# Patient Record
Sex: FEMALE | Race: WHITE | NOT HISPANIC OR LATINO | Employment: OTHER | URBAN - NONMETROPOLITAN AREA
[De-identification: names, ages, dates, MRNs, and addresses within clinical notes are randomized per-mention and may not be internally consistent; named-entity substitution may affect disease eponyms.]

---

## 2017-04-10 ENCOUNTER — TRANSCRIBE ORDERS (OUTPATIENT)
Dept: PHYSICAL THERAPY | Facility: HOSPITAL | Age: 32
End: 2017-04-10

## 2017-04-10 ENCOUNTER — HOSPITAL ENCOUNTER (OUTPATIENT)
Dept: PHYSICAL THERAPY | Facility: HOSPITAL | Age: 32
Setting detail: THERAPIES SERIES
Discharge: HOME | End: 2017-04-10

## 2017-04-10 DIAGNOSIS — G89.29 CHRONIC LEFT-SIDED LOW BACK PAIN WITHOUT SCIATICA: ICD-10-CM

## 2017-04-10 DIAGNOSIS — M47.26 OSTEOARTHRITIS OF SPINE WITH RADICULOPATHY, LUMBAR REGION: Primary | ICD-10-CM

## 2017-04-10 DIAGNOSIS — M54.42 ACUTE BACK PAIN WITH SCIATICA, LEFT: Primary | ICD-10-CM

## 2017-04-10 DIAGNOSIS — M54.50 CHRONIC LEFT-SIDED LOW BACK PAIN WITHOUT SCIATICA: ICD-10-CM

## 2017-04-10 PROCEDURE — G8979 MOBILITY GOAL STATUS: HCPCS

## 2017-04-10 PROCEDURE — 97162 PT EVAL MOD COMPLEX 30 MIN: CPT

## 2017-04-10 PROCEDURE — G8978 MOBILITY CURRENT STATUS: HCPCS

## 2017-04-10 PROCEDURE — 97161 PT EVAL LOW COMPLEX 20 MIN: CPT

## 2017-04-10 NOTE — PROGRESS NOTES
"    Outpatient Physical Therapy Ortho Initial Evaluation  [facility]     Patient Name: Arlen Brice  : 1985  MRN: [id number]  Today's Date: 4/10/2017      Visit Date: 04/10/2017    There is no problem list on file for this patient.       Past Medical History:   Diagnosis Date   • Asthma    • Disease of thyroid gland    • Hepatitis C         Past Surgical History:   Procedure Laterality Date   • APPENDECTOMY     •  SECTION      Total of 5   • CHOLECYSTECTOMY         Visit Dx:     ICD-10-CM ICD-9-CM   1. Osteoarthritis of spine with radiculopathy, lumbar region M47.26 721.3   2. Chronic left-sided low back pain without sciatica M54.5 724.2    G89.29 338.29             Patient History       04/10/17 1351          History    Chief Complaint Pain;Difficulty Walking;Difficulty with daily activities  -TC      Type of Pain Back pain;Lower Extremity / Leg   LLE   -TC      Date Current Problem(s) Began 17  -TC      Brief Description of Current Complaint Pt reports she has had 5 C sections and after her last birth she began to have nerve pain s/p epidural \"hitting a nerve.\" This is when her nerve pain/left leg pain began along with her chronic back pain. Her left leg will go all the way numb at times. The entire leg will go numb even the inside of her leg all the way down to her foot/toes. At this time she has no numbness or tingling. The sharp pain really stays in her back and shoots into her buttock with certain motions such as bending, picking something up and twising. N&T LLE increase with increase walking, standing, and reaching behind towards the left. Denies any falls and/or changes in bowel/bladder.    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 Yes  -TC      What clinical tests have you had for this problem? X-ray  " -TC      Results of Clinical Tests when she was 23 years old it showed degenerative changes   -TC      Pain     Pain Location Back  -TC      Pain at Present 5  -TC      Pain at Best 1;2  -TC      Pain at Worst 8  -TC      Pain Frequency Constant/continuous;Intermittent   constant back pain, intermittent LLE radicular pain   -TC      Pain Description Aching;Sharp;Shooting;Tingling;Numbness  -TC      What Performance Factors Make the Current Problem(s) WORSE? walking, standing >10 mins, bending over to  objects, reaching behind, trying to straighten up  -TC      What Performance Factors Make the Current Problem(s) BETTER? lying down, resting  -TC      Tolerance Time- Standing <10mins  -TC      Tolerance Time- Walking <10 mins  -TC      Is your sleep disturbed? Yes  -TC      What position do you sleep in? Right sidelying;Left sidelying  -TC      Fall Risk Assessment    Any falls in the past year: No  -TC      Services    Prior Rehab/Home Health Experiences Yes  -TC      Daily Activities    Primary Language English  -TC      Are you able to read Yes  -TC      Are you able to write Yes  -TC      How does patient learn best? Listening;Reading;Demonstration  -TC      Teaching needs identified Home Exercise Program;Management of Condition  -TC      Patient is concerned about/has problems with Walking;Standing;Performing home management (household chores, shopping, care of dependents)  -TC        User Key  (r) = Recorded By, (t) = Taken By, (c) = Cosigned By    Initials Name Provider Type    TC Ese Valdovinos, PT Physical Therapist                PT Ortho       04/10/17 1351    Posture/Observations    Alignment Options Thoracic kyphosis;Rounded shoulders;Cervical lordosis  -TC    Cervical Lordosis Moderate  -TC    Thoracic Kyphosis Moderate  -TC    Rounded Shoulders Moderate  -TC    Lumbar lordosis Moderate  -TC    Iliac crests --   level  -TC    Genu valgus Mild  -TC    Posture/Observations Comments also presents  with anterior pelvic tilt   -TC    Sensation    Light Touch No apparent deficits  -TC    Quarter Clearing    Quarter Clearing Lower Quarter Clearing  -TC    DTR- Lower Quarter Clearing    Patellar tendon (L2-4) Left:;1- Minimal response;Right:;2- Normal response  -TC    Achilles tendon (S1-2) Left:;Unable to assess;Right:;2- Normal response   pt unable to relax LLE limiting ability to test reflex  -TC    Neural Tension Signs- Lower Quarter Clearing    SLR Bilateral:;Negative  -TC    Sensory Screen for Light Touch- Lower Quarter Clearing    L1 (inguinal area) Bilateral:;Intact  -TC    L2 (anterior mid thigh) Bilateral:;Intact  -TC    L3 (distal anterior thigh) Bilateral:;Intact  -TC    L4 (medial lower leg/foot) Bilateral:;Intact  -TC    L5 (lateral lower leg/great toe) Bilateral:;Intact  -TC    S1 (bottom of foot) Bilateral:;Intact  -TC    Myotomal Screen- Lower Quarter Clearing    Hip flexion (L2) Left:;4- (Good -);Right:;4+ (Good +)  -TC    Knee extension (L3) Left:;3+ (Fair +);Right:;5 (Normal)  -TC    Ankle DF (L4) Bilateral:;4+ (Good +)  -TC    Great toe extension (L5) Bilateral:;4+ (Good +)  -TC    Ankle PF (S1) Bilateral:;5 (Normal)  -TC    Knee flexion (S2) Bilateral:;5 (Normal)  -TC    Lumbar ROM Screen- Lower Quarter Clearing    Lumbar Flexion Impaired   75% impaired; elicited pain on L   -TC    Lumbar Extension Impaired   pt unable to attain neutral; lacking 100%; pain on L  -TC    Lumbar Lateral Flexion Impaired   jenny 50% impaired; pain elicited with L SBing   -TC    Lumbar Rotation Impaired   Rt 50% and L 75% due to back pain and LLE pain to knee   -TC    SI/Hip Screen- Lower Quarter Clearing    ASIS compression Bilateral:;Negative  -TC    ASIS distraction Bilateral:;Negative  -TC    Posterior thigh sheer Left:;Positive  -TC    Special Tests/Palpation    Special Tests/Palpation Lumbar/SI;Cervical/Thoracic  -TC    Thoracic Accessory Motions    Thoracic Accessory Motions Tested? Yes  -TC    Pa glide-  Upper thoracic Hypomobile  -TC    Pa glide- Middle thoracic Hypomobile  -TC    Pa glide- Lower thoracic Hypomobile  -TC    PA glide- T7 --   cavitation noted   -TC    Lumbosacral Palpation    SI Left:;Tender  -TC    Lumbosacral Segment Tender  -TC    Spinous Process --   L 3-4 most tender  -TC    Piriformis Left:;Tender;Guarded/taut  -TC    Sacrotubrous Ligament Left:;Tender  -TC    Lumbosacral Palpation? Yes  -TC    Lumbosacral Accessory Motions    Lumbosacral Accessory Motions Tested? Yes  -TC    PA Glide- L3 Hypermobile  -TC    PA Glide- L4 Hypermobile  -TC    Lumbar/SI Special Tests    Stork Test (SI Dysfunction) Left:;Positive  -TC    Trendelenburg Test (Gluteus Medius Weakness) Bilateral:;Positive   L>R  -TC    SLR (Neural Tension) Bilateral:;Negative  -TC    Sacral Spring Test (SI Dysfunction) Positive   pain was centered and located directly over sacrum   -TC    ROM (Range of Motion)    General ROM lower extremity range of motion deficits identified;head/neck/trunk range of motion deficits identified  -TC    General Head/Neck/Trunk Assessment    ROM trunk ROM deficit  -TC    ROM Detail see detail listed above   -TC    General LE Assessment    ROM hip, left: LE ROM deficit;hip, right: LE ROM deficit  -TC    Left Hip    Flexion PROM Deficit 50% impaired   limited d/t increased hip pain and adipose tissue   -TC    Extension PROM Deficit 25%   -TC    Internal Rotation PROM Deficit 100%   -TC    External Rotation PROM Deficit 75%   -TC    Right Hip    Extension PROM Deficit 25%   -TC    Internal Rotation AROM Deficit 25%   -TC    MMT (Manual Muscle Testing)    General MMT Assessment upper extremity strength deficits identified;lower extremity strength deficits identified  -TC    Left Hip    Hip Abduction Gluteus Medius (3+/5) fair plus  -TC    Right Hip    Hip Abduction Gluteus Medius (3/5) fair   pt reported that this testing position elicited pain on Lt  -TC    Lower Extremity    Lower Ext Manual Muscle Testing  left hip strength deficit;right hip strength deficit;left knee strength deficit  -TC    Left Knee    Knee Extension Gross Movement (3+/5) fair plus  -TC    Pathomechanics    Spine Pathomechanics Excessive thoracic kyphosis with forward bend;Limited lumbar flattening with forward bend  -TC    Balance Skills Training    SLS BLE <5s ; LLE elicted pain described as N&T into post thigh  -TC    Gait Assessment/Treatment    Gait, Comment Pt ambulates with increased MATHEUS, increased knee flexion, an anterior pelvic tilt and limited trunk rotation with lateral trunk flexion.  -TC      User Key  (r) = Recorded By, (t) = Taken By, (c) = Cosigned By    Initials Name Provider Type    TC Ese Valdovinos, PT Physical Therapist                                PT OP Goals       04/10/17 1500       PT Short Term Goals    STG Date to Achieve 05/01/17  -TC     STG 1 Pt will be able to demonstrate a 50% improvement in her thoracolumbar ROM in order to restore overall posturing.    -TC     STG 1 Progress New  -     STG 2 Pt will illustrate improved posture avoiding forward flexed posture and anterior pelvic tilt to promote more functional movements during ADLs.    -TC     STG 2 Progress New  -     Long Term Goals    LTG Date to Achieve 05/22/17  -TC     LTG 1 Pt will be able to demonstrate full thoracolumbar ROM reporting <2-3/10 pain in order to restore overall posturing and decrease strain placed on lumbar spine.     -TC     LTG 1 Progress New  -     LTG 2 Pt will show full hip PROM painfree in order to improve mechanics of gait and decrease the amount of strain placed on her SIJ and lower lumbar spine.   -TC     LTG 2 Progress New  -TC     LTG 3 Pt will illustrate gluteus medius strength to 4/5 so that she will be able to demonstrate decreased compensatory strategies during gait and decrease abnormal forces placed through lumbar spine.   -TC     LTG 3 Progress New  -     LTG 4 Pt will demonstrate normalized mechanics of gait  avoiding lateral trunk flexion, bilateral knee extension, and increased thoracic rotation.   -TC     LTG 4 Progress New  -TC     LTG 5 Pt will be able to perform bilateral SLS for >15s in order to show improvements in hip and core control/stability.   -TC     LTG 5 Progress New  -TC     Time Calculation    PT Goal Re-Cert Due Date 05/10/17  -TC       User Key  (r) = Recorded By, (t) = Taken By, (c) = Cosigned By    Initials Name Provider Type    TC Ese Valdovinos, PT Physical Therapist                PT Assessment/Plan       04/10/17 1351       PT Assessment    Functional Limitations Impaired gait;Limitations in functional capacity and performance;Limitation in home management;Limitations in community activities  -TC     Impairments Balance;Endurance;Gait;Impaired flexibility;Impaired muscle length;Impaired postural alignment;Joint mobility;Impaired reflex integrity;Muscle strength;Pain;Posture;Range of motion  -TC     Assessment Comments Upon assessment pt presents with signs and symptoms that most closely correlate with L3-4 nerve impingement on the left along with an SIJ strain on the left. She demonstrates decreased patellar reflex, myotomal impairment with no signs of dermatomal loss. She illustrates a sway back posturing with an anterior pelvic tilt causing her to hinge at L3-4. We found her to present with thoracic hypomobility , left hip hypomobility, and L3-4 hypermobility. These impairments along with diminished core and gluteal activation lead to poor pathomechanics of the spine and place increased strain across her lumbar spine and SIJ during functional activity. We will work with her to progress her towards her functional goals. We may be limited towards progression due to chronicity of her symptoms and her comorbidities. Thank you for this referral, we look forward to working with her.   -TC     Please refer to paper survey for additional self-reported information Yes  -TC     Rehab Potential Good   -TC     Patient/caregiver participated in establishment of treatment plan and goals Yes  -TC     Patient would benefit from skilled therapy intervention Yes  -TC     PT Plan    PT Frequency 3x/week  -TC     Predicted Duration of Therapy Intervention (days/wks) 6 weeks   -TC     Planned CPT's? PT THER PROC EA 15 MIN: 20718;PT THER ACT EA 15 MIN: 22715;PT MANUAL THERAPY EA 15 MIN: 35933;PT NEUROMUSC RE-EDUCATION EA 15 MIN: 11899;PT GAIT TRAINING EA 15 MIN: 65279;PT ELECTRICAL STIM UNATTEND: ;PT ELECTRICAL STIM ATTD EA 15 MIN: 00001;PT ULTRASOUND EA 15 MIN: 87031;PT EVAL MOD COMPLELITY: 18230  -TC     Physical Therapy Interventions (Optional Details) balance training;gait training;home exercise program;joint mobilization;lumbar stabilization;manual therapy techniques;motor coordination training;neuromuscular re-education;patient/family education;postural re-education;ROM (Range of Motion);stair training;strengthening;stretching;taping;transfer training  -TC     PT Plan Comments Initially we will work towards improving her flexibility, spinal and hip mobility in order to restore her ROM, decrease radicular pain, and improve her postural alignment. We will address her poor core and gluteal activation in order to correct her mechanics during standing and gait related activities and progress her towards more functional strengthening to improve muscle imbalance to decrease the amount of strain placed on her lumbar spine and SIJ.  -TC       User Key  (r) = Recorded By, (t) = Taken By, (c) = Cosigned By    Initials Name Provider Type    TC Ese Valdovinos PT Physical Therapist                                    Outcome Measures       04/10/17 1351          Modified Oswestry    Modified Oswestry Score/Comments 28  -TC      Functional Assessment    Outcome Measure Options Modifed Owestry  -TC        User Key  (r) = Recorded By, (t) = Taken By, (c) = Cosigned By    Initials Name Provider Type    TC Ese Valdovinos,  PT Physical Therapist            Time Calculation:   Start Time: 1351  Stop Time: 1453  Time Calculation (min): 62 min     Therapy Charges for Today     Code Description Service Date Service Provider Modifiers Qty    81830342519 HC PT MOBILITY CURRENT 4/10/2017 Ese Valdovinos, PT GP, CJ 1    51136265557 HC PT MOBILITY PROJECTED 4/10/2017 Ese Valdovinos, PT GP, CI 1    [id number] HC PT EVAL MOD COMPLEXITY 4 4/10/2017 Ese Valdovinos, PT GP 1          PT G-Codes  Outcome Measure Options: Modifed Dennyy  Score: 28  Functional Limitation: Mobility: Walking and moving around  Mobility: Walking and Moving Around Current Status (): At least 20 percent but less than 40 percent impaired, limited or restricted  Mobility: Walking and Moving Around Goal Status (): At least 1 percent but less than 20 percent impaired, limited or restricted         Ese Valdovinos, PT  4/10/2017

## 2017-04-12 ENCOUNTER — HOSPITAL ENCOUNTER (OUTPATIENT)
Dept: PHYSICAL THERAPY | Facility: HOSPITAL | Age: 32
Setting detail: THERAPIES SERIES
Discharge: HOME | End: 2017-04-12

## 2017-04-12 DIAGNOSIS — M54.50 CHRONIC LEFT-SIDED LOW BACK PAIN WITHOUT SCIATICA: Primary | ICD-10-CM

## 2017-04-12 DIAGNOSIS — G89.29 CHRONIC LEFT-SIDED LOW BACK PAIN WITHOUT SCIATICA: Primary | ICD-10-CM

## 2017-04-12 PROCEDURE — 97110 THERAPEUTIC EXERCISES: CPT

## 2017-04-12 PROCEDURE — 97140 MANUAL THERAPY 1/> REGIONS: CPT

## 2017-04-12 NOTE — PROGRESS NOTES
Outpatient Physical Therapy Ortho Treatment Note  [facility]     Patient Name: Arlen Brice  : 1985  MRN: [id number]  Today's Date: 2017      Visit Date: 2017    Visit Dx:    ICD-10-CM ICD-9-CM   1. Chronic left-sided low back pain without sciatica M54.5 724.2    G89.29 338.29       There is no problem list on file for this patient.       Past Medical History:   Diagnosis Date   • Asthma    • Disease of thyroid gland    • Hepatitis C         Past Surgical History:   Procedure Laterality Date   • APPENDECTOMY     •  SECTION      Total of 5   • CHOLECYSTECTOMY               PT Ortho       04/10/17 1351    Posture/Observations    Alignment Options Thoracic kyphosis;Rounded shoulders;Cervical lordosis  -TC    Cervical Lordosis Moderate  -TC    Thoracic Kyphosis Moderate  -TC    Rounded Shoulders Moderate  -TC    Lumbar lordosis Moderate  -TC    Iliac crests --   level  -TC    Genu valgus Mild  -TC    Posture/Observations Comments also presents with anterior pelvic tilt   -TC    Sensation    Light Touch No apparent deficits  -TC    Quarter Clearing    Quarter Clearing Lower Quarter Clearing  -TC    DTR- Lower Quarter Clearing    Patellar tendon (L2-4) Left:;1- Minimal response;Right:;2- Normal response  -TC    Achilles tendon (S1-2) Left:;Unable to assess;Right:;2- Normal response   pt unable to relax LLE limiting ability to test reflex  -TC    Neural Tension Signs- Lower Quarter Clearing    SLR Bilateral:;Negative  -TC    Sensory Screen for Light Touch- Lower Quarter Clearing    L1 (inguinal area) Bilateral:;Intact  -TC    L2 (anterior mid thigh) Bilateral:;Intact  -TC    L3 (distal anterior thigh) Bilateral:;Intact  -TC    L4 (medial lower leg/foot) Bilateral:;Intact  -TC    L5 (lateral lower leg/great toe) Bilateral:;Intact  -TC    S1 (bottom of foot) Bilateral:;Intact  -TC    Myotomal Screen- Lower Quarter Clearing    Hip flexion (L2) Left:;4- (Good -);Right:;4+ (Good +)  -TC     Knee extension (L3) Left:;3+ (Fair +);Right:;5 (Normal)  -TC    Ankle DF (L4) Bilateral:;4+ (Good +)  -TC    Great toe extension (L5) Bilateral:;4+ (Good +)  -TC    Ankle PF (S1) Bilateral:;5 (Normal)  -TC    Knee flexion (S2) Bilateral:;5 (Normal)  -TC    Lumbar ROM Screen- Lower Quarter Clearing    Lumbar Flexion Impaired   75% impaired; elicited pain on L   -TC    Lumbar Extension Impaired   pt unable to attain neutral; lacking 100%; pain on L  -TC    Lumbar Lateral Flexion Impaired   jenny 50% impaired; pain elicited with L SBing   -TC    Lumbar Rotation Impaired   Rt 50% and L 75% due to back pain and LLE pain to knee   -TC    SI/Hip Screen- Lower Quarter Clearing    ASIS compression Bilateral:;Negative  -TC    ASIS distraction Bilateral:;Negative  -TC    Posterior thigh sheer Left:;Positive  -TC    Special Tests/Palpation    Special Tests/Palpation Lumbar/SI;Cervical/Thoracic  -TC    Thoracic Accessory Motions    Thoracic Accessory Motions Tested? Yes  -TC    Pa glide- Upper thoracic Hypomobile  -TC    Pa glide- Middle thoracic Hypomobile  -TC    Pa glide- Lower thoracic Hypomobile  -TC    PA glide- T7 --   cavitation noted   -TC    Lumbosacral Palpation    SI Left:;Tender  -TC    Lumbosacral Segment Tender  -TC    Spinous Process --   L 3-4 most tender  -TC    Piriformis Left:;Tender;Guarded/taut  -TC    Sacrotubrous Ligament Left:;Tender  -TC    Lumbosacral Palpation? Yes  -TC    Lumbosacral Accessory Motions    Lumbosacral Accessory Motions Tested? Yes  -TC    PA Glide- L3 Hypermobile  -TC    PA Glide- L4 Hypermobile  -TC    Lumbar/SI Special Tests    Stork Test (SI Dysfunction) Left:;Positive  -TC    Trendelenburg Test (Gluteus Medius Weakness) Bilateral:;Positive   L>R  -TC    SLR (Neural Tension) Bilateral:;Negative  -TC    Sacral Spring Test (SI Dysfunction) Positive   pain was centered and located directly over sacrum   -TC    ROM (Range of Motion)    General ROM lower extremity range of motion deficits  identified;head/neck/trunk range of motion deficits identified  -TC    General Head/Neck/Trunk Assessment    ROM trunk ROM deficit  -TC    ROM Detail see detail listed above   -TC    General LE Assessment    ROM hip, left: LE ROM deficit;hip, right: LE ROM deficit  -TC    Left Hip    Flexion PROM Deficit 50% impaired   limited d/t increased hip pain and adipose tissue   -TC    Extension PROM Deficit 25%   -TC    Internal Rotation PROM Deficit 100%   -TC    External Rotation PROM Deficit 75%   -TC    Right Hip    Extension PROM Deficit 25%   -TC    Internal Rotation AROM Deficit 25%   -TC    MMT (Manual Muscle Testing)    General MMT Assessment upper extremity strength deficits identified;lower extremity strength deficits identified  -TC    Left Hip    Hip Abduction Gluteus Medius (3+/5) fair plus  -TC    Right Hip    Hip Abduction Gluteus Medius (3/5) fair   pt reported that this testing position elicited pain on Lt  -TC    Lower Extremity    Lower Ext Manual Muscle Testing left hip strength deficit;right hip strength deficit;left knee strength deficit  -TC    Left Knee    Knee Extension Gross Movement (3+/5) fair plus  -TC    Pathomechanics    Spine Pathomechanics Excessive thoracic kyphosis with forward bend;Limited lumbar flattening with forward bend  -TC    Balance Skills Training    SLS BLE <5s ; LLE elicted pain described as N&T into post thigh  -TC    Gait Assessment/Treatment    Gait, Comment Pt ambulates with increased MATHEUS, increased knee flexion, an anterior pelvic tilt and limited trunk rotation with lateral trunk flexion.  -      User Key  (r) = Recorded By, (t) = Taken By, (c) = Cosigned By    Initials Name Provider Type     Ese Valdovinos, PT Physical Therapist                            PT Assessment/Plan       04/12/17 8506       PT Assessment    Assessment Comments Pt reports similar symptoms also stating that she has not been able to perform her HEP. She did not report any LLE symptoms  throughout our session today, nothing exacerbated. Her thoracic and lumbosacral spine continue to be hypomobile along with her hips limiting her spinal ROM and continuing to place increased strain at L3-4 and L SIJ.   -TC     PT Plan    PT Plan Comments We will continue to initially address her spinal and hip mobility and flexibility in order to restore ROM. Reassess HEP, avoid radicular pain and improve her core and gluteal stability.   -TC       User Key  (r) = Recorded By, (t) = Taken By, (c) = Cosigned By    Initials Name Provider Type    TC Ese Valdovinos, PT Physical Therapist                    Exercises       04/12/17 1331          Subjective Comments    Subjective Comments Pt reported that she feels the same. She has not gotten a chance to do exercises yesterday. She cleaned yesterday and she had increased pain   -TC      Subjective Pain    Able to rate subjective pain? yes  -TC      Pre-Treatment Pain Level 7  -TC      Subjective Pain Comment Pt reports low back and denies LLE pain   -TC      Exercise 1    Exercise Name 1 LTR in hooklying   -TC      Cueing 1 Demo  -TC      Sets 1 2  -TC      Reps 1 15  -TC      Exercise 2    Exercise Name 2 PPT in hooklying   -TC      Cueing 2 Demo  -TC      Sets 2 3  -TC      Reps 2 10  -TC      Exercise 3    Exercise Name 3 BKFO w/o resistance   -TC      Cueing 3 Demo  -TC      Sets 3 2  -TC      Reps 3 15  -TC      Exercise 4    Exercise Name 4 Educated on compliance with HEP and importance of exercise and posture  -TC      Time (Minutes) 4 2  -TC        User Key  (r) = Recorded By, (t) = Taken By, (c) = Cosigned By    Initials Name Provider Type    TC Ese Valdovinos, PT Physical Therapist                        Manual Rx (last 36 hours)      Manual Treatments       04/12/17 1331          Manual Rx 1    Manual Rx 1 Location prone thoracic  -TC      Manual Rx 1 Type central PAs to lower and upper and T5-7  -TC      Manual Rx 1 Grade 2-3  -TC      Manual Rx 1  Duration 10  -TC      Manual Rx 2    Manual Rx 2 Location L hip   -TC      Manual Rx 2 Type lateral and inferior hip glides at 60, 90, and 90 w/ some IR  -TC      Manual Rx 2 Grade 3  -TC      Manual Rx 2 Duration 10  -TC      Manual Rx 3    Manual Rx 3 Location Lt leg   -TC      Manual Rx 3 Type LAD  -TC      Manual Rx 3 Duration 1--no change in symptoms   -TC      Manual Rx 4    Manual Rx 4 Location Lt hip   -TC      Manual Rx 4 Type PROM into IR and ER--painfree and improved IR afterwards   -TC      Manual Rx 4 Duration 5  -TC      Manual Rx 5    Manual Rx 5 Location prone LS distraction   -TC      Manual Rx 5 Grade 3  -TC      Manual Rx 5 Duration 2  -TC        User Key  (r) = Recorded By, (t) = Taken By, (c) = Cosigned By    Initials Name Provider Type    TC Ese Valdovinos, PT Physical Therapist                PT OP Goals       04/12/17 1331       PT Short Term Goals    STG Date to Achieve 05/01/17  -TC     STG 1 Pt will be able to demonstrate a 50% improvement in her thoracolumbar ROM in order to restore overall posturing.    -TC     STG 1 Progress Ongoing  -TC     STG 2 Pt will illustrate improved posture avoiding forward flexed posture and anterior pelvic tilt to promote more functional movements during ADLs.    -TC     STG 2 Progress Ongoing  -TC     Long Term Goals    LTG Date to Achieve 05/22/17  -TC     LTG 1 Pt will be able to demonstrate full thoracolumbar ROM reporting <2-3/10 pain in order to restore overall posturing and decrease strain placed on lumbar spine.     -TC     LTG 1 Progress Ongoing  -TC     LTG 1 Progress Comments still reports 7/10 pain with intermittent radicular pain but none during session  -TC     LTG 2 Pt will show full hip PROM painfree in order to improve mechanics of gait and decrease the amount of strain placed on her SIJ and lower lumbar spine.   -TC     LTG 2 Progress Ongoing  -TC     LTG 3 Pt will illustrate gluteus medius strength to 4/5 so that she will be able to  demonstrate decreased compensatory strategies during gait and decrease abnormal forces placed through lumbar spine.   -TC     LTG 3 Progress Ongoing  -TC     LTG 4 Pt will demonstrate normalized mechanics of gait avoiding lateral trunk flexion, bilateral knee extension, and increased thoracic rotation.   -TC     LTG 4 Progress Ongoing  -TC     LTG 5 Pt will be able to perform bilateral SLS for >15s in order to show improvements in hip and core control/stability.   -TC     LTG 5 Progress Ongoing  -TC     Time Calculation    PT Goal Re-Cert Due Date 05/10/17  -TC       User Key  (r) = Recorded By, (t) = Taken By, (c) = Cosigned By    Initials Name Provider Type    TC Ese Valdovinos, PT Physical Therapist                Therapy Education       04/12/17 1422          Therapy Education    Given HEP;Posture/body mechanics;Symptoms/condition management   Educated on compliance with HEP and importance of exercise and posture  -TC      Program New   and reinforced previous; added LTR  -TC      How Provided Verbal;Demonstration;Written  -TC      Provided to Patient  -TC      Level of Understanding Teach back education performed;Verbalized;Demonstrated  -TC        User Key  (r) = Recorded By, (t) = Taken By, (c) = Cosigned By    Initials Name Provider Type    NATHALIE Valdovinos, PT Physical Therapist                Outcome Measures       04/10/17 1351          Modified Oswestry    Modified Oswestry Score/Comments 28  -TC      Functional Assessment    Outcome Measure Options Modifed Owestry  -TC        User Key  (r) = Recorded By, (t) = Taken By, (c) = Cosigned By    Initials Name Provider Type    NATHALIE Valdovinos PT Physical Therapist            Time Calculation:   Start Time: 1331  Stop Time: 1415  Time Calculation (min): 44 min  Total Timed Code Minutes- PT: 44 minute(s)    Therapy Charges for Today     Code Description Service Date Service Provider Modifiers Qty    25984229475 HC PT MANUAL THERAPY EA 15 MIN  4/12/2017 Ese Valdovinos, PT GP 2    [id number]  PT THER PROC EA 15 MIN 4/12/2017 Ese Valdovinos, PT GP 1                    Ese Valdovinos, PT  4/12/2017

## 2017-04-14 ENCOUNTER — HOSPITAL ENCOUNTER (OUTPATIENT)
Dept: PHYSICAL THERAPY | Facility: HOSPITAL | Age: 32
Setting detail: THERAPIES SERIES
Discharge: HOME | End: 2017-04-14

## 2017-04-14 DIAGNOSIS — M47.26 OSTEOARTHRITIS OF SPINE WITH RADICULOPATHY, LUMBAR REGION: ICD-10-CM

## 2017-04-14 DIAGNOSIS — G89.29 CHRONIC LEFT-SIDED LOW BACK PAIN WITHOUT SCIATICA: Primary | ICD-10-CM

## 2017-04-14 DIAGNOSIS — M54.50 CHRONIC LEFT-SIDED LOW BACK PAIN WITHOUT SCIATICA: Primary | ICD-10-CM

## 2017-04-14 PROCEDURE — 97110 THERAPEUTIC EXERCISES: CPT

## 2017-04-14 PROCEDURE — 97140 MANUAL THERAPY 1/> REGIONS: CPT

## 2017-04-14 NOTE — PROGRESS NOTES
Outpatient Physical Therapy Ortho Progress Note   [facility]     Patient Name: Arlen Brice  : 1985  MRN: [id number]  Today's Date: 2017      Visit Date: 2017    Visit Dx:    ICD-10-CM ICD-9-CM   1. Chronic left-sided low back pain without sciatica M54.5 724.2    G89.29 338.29   2. Osteoarthritis of spine with radiculopathy, lumbar region M47.26 721.3       There is no problem list on file for this patient.       Past Medical History:   Diagnosis Date   • Asthma    • Disease of thyroid gland    • Hepatitis C         Past Surgical History:   Procedure Laterality Date   • APPENDECTOMY     •  SECTION      Total of 5   • CHOLECYSTECTOMY                               PT Assessment/Plan       17 1258       PT Assessment    Assessment Comments Patient did not report any symptoms into left leg today.  Her low back pain was increased at end of treatment most likely due to increased recruitment of hip musculature. She continues to be weak in core and very limited for rotation in left hip, along with decreased mobility in thoracic spine.  -PAOLA     PT Plan    PT Plan Comments We will continue to focus on spine and hip mobility along with progressing with gentle core recruitment and gluteal recruitment.  -PAOAL       User Key  (r) = Recorded By, (t) = Taken By, (c) = Cosigned By    Initials Name Provider Type    PAOLA Samano PTA Physical Therapy Assistant                    Exercises       17 1252          Subjective Comments    Subjective Comments Patient reports yesterday her left leg was going numb while doing housework.  She reports no numbness or pain into left leg today  however.  She also reports the pain is only her low back today.  She does voice she has been picking up and moving dressers today at the house which has increased pain.  -PAOLA      Subjective Pain    Able to rate subjective pain? yes  -PAOLA      Pre-Treatment Pain Level 7  -PAOLA      Post-Treatment Pain Level 8   -PAOLA      Subjective Pain Comment Post pain in low back  -PAOLA      Exercise 1    Exercise Name 1 LTR in hooklying   -PAOLA      Cueing 1 Verbal  -PAOLA      Sets 1 1  -PAOLA      Reps 1 15  -PAOLA      Exercise 2    Exercise Name 2 BKFO w/o resistance   -PAOLA      Cueing 2 Verbal;Tactile  -PAOLA      Equipment 2 --   pt. did experience spasm in left leg,but was relieved quicky  -PAOLA      Sets 2 2  -PAOLA      Reps 2 15  -PAOLA      Exercise 3    Exercise Name 3 (L) IR/ER PROM without pain  -PAOLA      Time (Minutes) 3 2  -PAOLA      Exercise 4    Exercise Name 4 hooklying hip abduction  -PAOLA      Cueing 4 Tactile;Verbal  -PAOLA      Equipment 4 Theraband  -PAOLA      Resistance 4 Red  -PAOLA      Sets 4 2  -PAOLA      Reps 4 15  -PAOLA        User Key  (r) = Recorded By, (t) = Taken By, (c) = Cosigned By    Initials Name Provider Type    PAOLA Samano PTA Physical Therapy Assistant                        Manual Rx (last 36 hours)      Manual Treatments       04/14/17 1301          Manual Rx 1    Manual Rx 1 Location prone thoracic  -PAOLA      Manual Rx 1 Type Central PA's to upper   Pt. reports increased pressure when over T5-T7  -PAOLA      Manual Rx 1 Grade 2-3  -PAOLA      Manual Rx 1 Duration 10  -PAOLA      Manual Rx 2    Manual Rx 2 Location L hip   -PAOLA      Manual Rx 2 Type lateral and inferior hip glides at 60, 90,   -PAOLA      Manual Rx 2 Grade 3  -PAOLA      Manual Rx 2 Duration 5  -PAOLA      Manual Rx 5    Manual Rx 5 Location B lower lumbar  -PAOLA      Manual Rx 5 Type STM  -PAOLA      Manual Rx 5 Duration 5  -PAOLA        User Key  (r) = Recorded By, (t) = Taken By, (c) = Cosigned By    Initials Name Provider Type    PAOLA Samano PTA Physical Therapy Assistant                PT OP Goals       04/14/17 1258 04/14/17 1248    PT Short Term Goals    STG Date to Achieve 05/01/17  -PAOLA --  -PAOLA    STG 1 Pt will be able to demonstrate a 50% improvement in her thoracolumbar ROM in order to restore overall posturing.    -PAOLA --  -PAOLA    STG 1 Progress Ongoing  -PAOLA --  -PAOLA     STG 1 Progress Comments She reports 75%  -PAOLA --  -PAOLA    STG 2 Pt will illustrate improved posture avoiding forward flexed posture and anterior pelvic tilt to promote more functional movements during ADLs.    -PAOLA --  -PAOLA    STG 2 Progress Ongoing  -PAOLA --  -PAOLA    Long Term Goals    LTG Date to Achieve 05/22/17  -PAOLA --  -PAOLA    LTG 1 Pt will be able to demonstrate full thoracolumbar ROM reporting <2-3/10 pain in order to restore overall posturing and decrease strain placed on lumbar spine.     -PAOLA --  -PAOLA    LTG 1 Progress Ongoing  -PAOLA --  -PAOLA    LTG 2 Pt will show full hip PROM painfree in order to improve mechanics of gait and decrease the amount of strain placed on her SIJ and lower lumbar spine.   -PAOLA --  -PAOLA    LTG 2 Progress Ongoing  -PAOLA --  -PAOLA    LTG 3 Pt will illustrate gluteus medius strength to 4/5 so that she will be able to demonstrate decreased compensatory strategies during gait and decrease abnormal forces placed through lumbar spine.   -PAOLA --  -PAOLA    LTG 3 Progress Ongoing  -PAOLA --  -PAOLA    LTG 4 Pt will demonstrate normalized mechanics of gait avoiding lateral trunk flexion, bilateral knee extension, and increased thoracic rotation.   -PAOLA --  -PAOLA    LTG 4 Progress Ongoing  -PAOLA --  -PAOLA    LTG 5 Pt will be able to perform bilateral SLS for >15s in order to show improvements in hip and core control/stability.   -PAOLA --  -PAOLA    LTG 5 Progress Ongoing  -PAOLA --  -PAOLA    Time Calculation    PT Goal Re-Cert Due Date 05/10/17  -PAOLA --  -PAOLA      User Key  (r) = Recorded By, (t) = Taken By, (c) = Cosigned By    Initials Name Provider Type    PAOLA Samano PTA Physical Therapy Assistant                Therapy Education       04/14/17 7326          Therapy Education    Given Symptoms/condition management  -PAOLA      Program Reinforced  -PAOLA      How Provided Verbal  -PAOLA      Provided to Patient  -PAOLA      Level of Understanding Verbalized  -PAOLA        User Key  (r) = Recorded By, (t) = Taken By, (c) = Cosigned By     Initials Name Provider Type    PAOLA Alexis Samano PTA Physical Therapy Assistant                Time Calculation:   Start Time: 1258  Stop Time: 1345  Time Calculation (min): 47 min  PT Non-Billable Time (min): 7 min  Total Timed Code Minutes- PT: 40 minute(s)    Therapy Charges for Today     Code Description Service Date Service Provider Modifiers Qty    19618163837 HC PT THER PROC EA 15 MIN 4/14/2017 Alexis Samano PTA GP 2    39810681185 HC PT MANUAL THERAPY EA 15 MIN 4/14/2017 Alexis Samano PTA GP 1                    Aelxis Samano PTA  4/14/2017

## 2017-04-17 ENCOUNTER — HOSPITAL ENCOUNTER (OUTPATIENT)
Dept: PHYSICAL THERAPY | Facility: HOSPITAL | Age: 32
Setting detail: THERAPIES SERIES
Discharge: HOME | End: 2017-04-17

## 2017-04-17 DIAGNOSIS — M47.26 OSTEOARTHRITIS OF SPINE WITH RADICULOPATHY, LUMBAR REGION: ICD-10-CM

## 2017-04-17 DIAGNOSIS — G89.29 CHRONIC LEFT-SIDED LOW BACK PAIN WITHOUT SCIATICA: Primary | ICD-10-CM

## 2017-04-17 DIAGNOSIS — M54.50 CHRONIC LEFT-SIDED LOW BACK PAIN WITHOUT SCIATICA: Primary | ICD-10-CM

## 2017-04-17 PROCEDURE — 97110 THERAPEUTIC EXERCISES: CPT

## 2017-04-17 PROCEDURE — 97140 MANUAL THERAPY 1/> REGIONS: CPT

## 2017-04-17 NOTE — PROGRESS NOTES
Outpatient Physical Therapy Ortho Treatment Note  [facility]     Patient Name: Arlen Brice  : 1985  MRN: [id number]  Today's Date: 2017      Visit Date: 2017    Visit Dx:    ICD-10-CM ICD-9-CM   1. Chronic left-sided low back pain without sciatica M54.5 724.2    G89.29 338.29   2. Osteoarthritis of spine with radiculopathy, lumbar region M47.26 721.3       There is no problem list on file for this patient.       Past Medical History:   Diagnosis Date   • Asthma    • Disease of thyroid gland    • Hepatitis C         Past Surgical History:   Procedure Laterality Date   • APPENDECTOMY     •  SECTION      Total of 5   • CHOLECYSTECTOMY                               PT Assessment/Plan       17 1024       PT Assessment    Assessment Comments She reports she did not have any pain into her LLE over the weekend just pain localized in her back mainly on the left side. She reports daily compliance with her HEP. Her IR of jenny hips improved significantly today post mobilizations and her pain was somewhat relieved. We continue to address her poor core and hip activation as well.   -TC     PT Plan    PT Plan Comments Continue to address her spinal and hip mobility along with core and glute recruitment avoiding exacerbation of pain.   -TC       User Key  (r) = Recorded By, (t) = Taken By, (c) = Cosigned By    Initials Name Provider Type    TC Ese Valdovinos, PT Physical Therapist                    Exercises       17 0932          Subjective Comments    Subjective Comments Pt reported that she is in more pain today. It is bearable.   -TC      Subjective Pain    Able to rate subjective pain? yes  -TC      Pre-Treatment Pain Level 8  -TC      Subjective Pain Comment L side of back   -TC      Exercise 1    Exercise Name 1 resisted BKFO   -TC      Cueing 1 Verbal;Tactile  -TC      Equipment 1 Theraband  -TC      Resistance 1 Red  -TC      Sets 1 3  -TC      Reps 1 15  -TC       Exercise 2    Exercise Name 2 TA contraction hooklying    began to increase her back pain and therefore we terminated   -TC      Cueing 2 Verbal;Tactile;Demo  -TC      Sets 2 --  -TC      Reps 2 3  -TC      Exercise 3    Exercise Name 3 sidelying clam shell    added to HEP   -TC      Cueing 3 Verbal;Demo;Tactile  -TC      Sets 3 3  -TC      Reps 3 15  -TC        User Key  (r) = Recorded By, (t) = Taken By, (c) = Cosigned By    Initials Name Provider Type    TC Ese Valdovinos, PT Physical Therapist                        Manual Rx (last 36 hours)      Manual Treatments       04/17/17 0932          Manual Rx 1    Manual Rx 1 Location prone thoracic  -TC      Manual Rx 1 Type central PAs to lower and upper and T4-7   cavitation noted at T4 with pain relief afterwards   -TC      Manual Rx 1 Grade 2-3  -TC      Manual Rx 1 Duration 10  -TC      Manual Rx 2    Manual Rx 2 Location L hip   -TC      Manual Rx 2 Type lateral and inferior hip glides at 60, 90, and 90 w/ some IR  -TC      Manual Rx 2 Grade 3 (3x45s each jenny)  -TC      Manual Rx 2 Duration 10  -TC      Manual Rx 4    Manual Rx 4 Location Lt hip   -TC      Manual Rx 4 Type PROM into IR and ER--painfree and improved IR afterwards   -TC      Manual Rx 4 Duration 5  -TC        User Key  (r) = Recorded By, (t) = Taken By, (c) = Cosigned By    Initials Name Provider Type    TC Ese Valdovinos, PT Physical Therapist                PT OP Goals       04/17/17 0932       PT Short Term Goals    STG Date to Achieve 05/01/17  -TC     STG 1 Pt will be able to demonstrate a 50% improvement in her thoracolumbar ROM in order to restore overall posturing.    -TC     STG 1 Progress Ongoing  -TC     STG 2 Pt will illustrate improved posture avoiding forward flexed posture and anterior pelvic tilt to promote more functional movements during ADLs.    -TC     STG 2 Progress Ongoing  -TC     Long Term Goals    LTG Date to Achieve 05/22/17  -TC     LTG 1 Pt will be able to  demonstrate full thoracolumbar ROM reporting <2-3/10 pain in order to restore overall posturing and decrease strain placed on lumbar spine.     -TC     LTG 1 Progress Ongoing  -TC     LTG 1 Progress Comments 8/10 today   -TC     LTG 2 Pt will show full hip PROM painfree in order to improve mechanics of gait and decrease the amount of strain placed on her SIJ and lower lumbar spine.   -TC     LTG 2 Progress Ongoing  -TC     LTG 2 Progress Comments working on improving IR with hip mobilizations bilaterally ; LLE IR improved to 23 deg post manual   -TC     LTG 3 Pt will illustrate gluteus medius strength to 4/5 so that she will be able to demonstrate decreased compensatory strategies during gait and decrease abnormal forces placed through lumbar spine.   -TC     LTG 3 Progress Ongoing  -TC     LTG 4 Pt will demonstrate normalized mechanics of gait avoiding lateral trunk flexion, bilateral knee extension, and increased thoracic rotation.   -TC     LTG 4 Progress Ongoing  -TC     LTG 5 Pt will be able to perform bilateral SLS for >15s in order to show improvements in hip and core control/stability.   -TC     LTG 5 Progress Ongoing  -TC     Time Calculation    PT Goal Re-Cert Due Date 05/10/17  -TC       User Key  (r) = Recorded By, (t) = Taken By, (c) = Cosigned By    Initials Name Provider Type    NATHALIE Valdovinso PT Physical Therapist                Therapy Education       04/17/17 1020          Therapy Education    Given HEP;Posture/body mechanics  -TC      Program New   added sidelying clam shells   -TC      How Provided Verbal;Demonstration;Written  -TC      Provided to Patient  -TC      Level of Understanding Teach back education performed;Verbalized;Demonstrated  -TC        User Key  (r) = Recorded By, (t) = Taken By, (c) = Cosigned By    Initials Name Provider Type    NATHALIE Valdovinos PT Physical Therapist                Time Calculation:   Start Time: 0932  Stop Time: 1019  Time Calculation (min): 47  min  Total Timed Code Minutes- PT: 47 minute(s)    Therapy Charges for Today     Code Description Service Date Service Provider Modifiers Qty    07549731799 HC PT MANUAL THERAPY EA 15 MIN 4/17/2017 Ese Valdovinos, PT GP 1    67593119313 HC PT THER PROC EA 15 MIN 4/17/2017 Ese Valdovinos, PT GP 1    23388327297 HC PT MANUAL THERAPY EA 15 MIN 4/17/2017 Ese Valdovinos, PT GP 2    08025090162 HC PT THER PROC EA 15 MIN 4/17/2017 Ese Valdovinos, PT GP 1                    Ese Valdovinos, PT  4/17/2017

## 2017-04-19 ENCOUNTER — HOSPITAL ENCOUNTER (OUTPATIENT)
Dept: PHYSICAL THERAPY | Facility: HOSPITAL | Age: 32
Setting detail: THERAPIES SERIES
Discharge: HOME | End: 2017-04-19

## 2017-04-19 DIAGNOSIS — M47.26 OSTEOARTHRITIS OF SPINE WITH RADICULOPATHY, LUMBAR REGION: ICD-10-CM

## 2017-04-19 DIAGNOSIS — G89.29 CHRONIC LEFT-SIDED LOW BACK PAIN WITHOUT SCIATICA: Primary | ICD-10-CM

## 2017-04-19 DIAGNOSIS — M54.50 CHRONIC LEFT-SIDED LOW BACK PAIN WITHOUT SCIATICA: Primary | ICD-10-CM

## 2017-04-19 PROCEDURE — 97140 MANUAL THERAPY 1/> REGIONS: CPT

## 2017-04-19 PROCEDURE — 97110 THERAPEUTIC EXERCISES: CPT

## 2017-04-19 NOTE — PROGRESS NOTES
"    Outpatient Physical Therapy Ortho Progress Note  [facility]     Patient Name: Arlen Brice  : 1985  MRN: [id number]  Today's Date: 2017      Visit Date: 2017    Visit Dx:    ICD-10-CM ICD-9-CM   1. Chronic left-sided low back pain without sciatica M54.5 724.2    G89.29 338.29   2. Osteoarthritis of spine with radiculopathy, lumbar region M47.26 721.3       There is no problem list on file for this patient.       Past Medical History:   Diagnosis Date   • Asthma    • Disease of thyroid gland    • Hepatitis C         Past Surgical History:   Procedure Laterality Date   • APPENDECTOMY     •  SECTION      Total of 5   • CHOLECYSTECTOMY                               PT Assessment/Plan       17 0932       PT Assessment    Assessment Comments She reports it has been a week since she has experienced symptoms down left leg and her pain has been down since last treatment.  Her hip internal rotation continues to improve.  She continues to sit in forward shoulder posture, and even with cueing has difficulty improving.  -PAOLA     PT Plan    PT Plan Comments Continue to address posture and progress with hip/core recruitment  -PAOLA       User Key  (r) = Recorded By, (t) = Taken By, (c) = Cosigned By    Initials Name Provider Type    PAOLA Samano, DUSTIN Physical Therapy Assistant                    Exercises       17 0932          Subjective Comments    Subjective Comments She reports she has been \"fairly descent\" since last treatment.  Her pain level is down.  She reports she is doing HEP twice daily  -PAOLA      Subjective Pain    Able to rate subjective pain? yes  -PAOLA      Pre-Treatment Pain Level 6  -PAOLA      Post-Treatment Pain Level 6  -PAOLA      Subjective Pain Comment L side of lower back, and a little over into the right  -PAOLA      Exercise 1    Exercise Name 1 thoracic towel stretch   pt. reports having shooting pain down R at end of 4 minutes  -PAOLA      Cueing 1 Verbal;Tactile  -PAOLA "      Time (Minutes) 1 4  -PAOLA      Exercise 2    Exercise Name 2 jenny IR stretch  -PAOLA      Cueing 2 Verbal;Tactile  -PAOLA      Reps 2 3  -PAOLA      Time (Seconds) 2 15-20s  -PAOLA      Exercise 3    Exercise Name 3 jenny SKTC  -PAOLA      Cueing 3 Verbal;Tactile  -PAOLA      Reps 3 3  -PAOLA      Time (Seconds) 3 15-20s  -PAOLA      Exercise 4    Exercise Name 4 resisted BKFO   -PAOLA      Cueing 4 Verbal;Tactile  -PAOLA      Equipment 4 Theraband  -PAOLA      Resistance 4 Red  -PAOLA      Sets 4 3  -PAOLA      Reps 4 15  -PAOLA      Exercise 5    Exercise Name 5 jenny sidelying place and holds into abd/ext  -PAOLA      Cueing 5 Tactile  -PAOLA      Reps 5 3  -PAOLA      Time (Seconds) 5 5 second holds  -PAOLA        User Key  (r) = Recorded By, (t) = Taken By, (c) = Cosigned By    Initials Name Provider Type    PAOLA Samano PTA Physical Therapy Assistant                        Manual Rx (last 36 hours)      Manual Treatments       04/19/17 0934          Manual Rx 1    Manual Rx 1 Location prone thoracic  -PAOLA      Manual Rx 1 Type ext mobs to upper and middle  -PAOLA      Manual Rx 1 Grade 2-3 sustained   -PAOLA      Manual Rx 1 Duration 10  -PAOLA        User Key  (r) = Recorded By, (t) = Taken By, (c) = Cosigned By    Initials Name Provider Type    PAOLA Samano PTA Physical Therapy Assistant                PT OP Goals       04/19/17 0932       PT Short Term Goals    STG Date to Achieve 05/01/17  -PAOLA     STG 1 Pt will be able to demonstrate a 50% improvement in her thoracolumbar ROM in order to restore overall posturing.    -PAOLA     STG 1 Progress Ongoing  -PAOLA     STG 2 Pt will illustrate improved posture avoiding forward flexed posture and anterior pelvic tilt to promote more functional movements during ADLs.    -PAOLA     STG 2 Progress Ongoing  -PAOLA     STG 2 Progress Comments Patient continues to sit with forward shoulders, and even with cueing has difficulty correcting poor posture.  -PAOLA     Long Term Goals    LTG Date to Achieve 05/22/17  -PAOLA     LTG 1 Pt will  be able to demonstrate full thoracolumbar ROM reporting <2-3/10 pain in order to restore overall posturing and decrease strain placed on lumbar spine.     -PAOLA     LTG 1 Progress Ongoing  -PAOLA     LTG 2 Pt will show full hip PROM painfree in order to improve mechanics of gait and decrease the amount of strain placed on her SIJ and lower lumbar spine.   -PAOLA     LTG 2 Progress Ongoing  -PAOLA     LTG 3 Pt will illustrate gluteus medius strength to 4/5 so that she will be able to demonstrate decreased compensatory strategies during gait and decrease abnormal forces placed through lumbar spine.   -PAOLA     LTG 3 Progress Ongoing  -PAOLA     LTG 4 Pt will demonstrate normalized mechanics of gait avoiding lateral trunk flexion, bilateral knee extension, and increased thoracic rotation.   -PAOLA     LTG 4 Progress Ongoing  -PAOLA     LTG 5 Pt will be able to perform bilateral SLS for >15s in order to show improvements in hip and core control/stability.   -PAOLA     LTG 5 Progress Ongoing  -PAOLA     Time Calculation    PT Goal Re-Cert Due Date 05/10/17  -PAOLA       User Key  (r) = Recorded By, (t) = Taken By, (c) = Cosigned By    Initials Name Provider Type    PAOLA Samano PTA Physical Therapy Assistant                Therapy Education       04/19/17 1247          Therapy Education    Given Posture/body mechanics  -PAOLA      Program Reinforced  -PAOLA      How Provided Verbal  -PAOLA      Provided to Patient  -PAOLA      Level of Understanding Verbalized  -PAOLA        User Key  (r) = Recorded By, (t) = Taken By, (c) = Cosigned By    Initials Name Provider Type    PAOLA Samano PTA Physical Therapy Assistant                Time Calculation:   Start Time: 0932  Stop Time: 1015  Time Calculation (min): 43 min  PT Non-Billable Time (min): 3 min  Total Timed Code Minutes- PT: 40 minute(s)    Therapy Charges for Today     Code Description Service Date Service Provider Modifiers Qty    64959996146 HC PT MANUAL THERAPY EA 15 MIN 4/19/2017 Alexis MORTENSEN  Sew[place], PTA GP 1    [id number]  PT THER PROC EA 15 MIN 4/19/2017 Alexis Samano PTA GP 2                    Alexis Samano, DUSTIN  4/19/2017

## 2017-04-24 ENCOUNTER — HOSPITAL ENCOUNTER (OUTPATIENT)
Dept: PHYSICAL THERAPY | Facility: HOSPITAL | Age: 32
Setting detail: THERAPIES SERIES
Discharge: HOME | End: 2017-04-24

## 2017-04-24 DIAGNOSIS — G89.29 CHRONIC LEFT-SIDED LOW BACK PAIN WITHOUT SCIATICA: Primary | ICD-10-CM

## 2017-04-24 DIAGNOSIS — M54.50 CHRONIC LEFT-SIDED LOW BACK PAIN WITHOUT SCIATICA: Primary | ICD-10-CM

## 2017-04-24 DIAGNOSIS — M47.26 OSTEOARTHRITIS OF SPINE WITH RADICULOPATHY, LUMBAR REGION: ICD-10-CM

## 2017-04-24 PROCEDURE — 97140 MANUAL THERAPY 1/> REGIONS: CPT

## 2017-04-24 PROCEDURE — 97110 THERAPEUTIC EXERCISES: CPT

## 2017-04-24 NOTE — PROGRESS NOTES
Outpatient Physical Therapy Ortho Treatment Note  [facility]     Patient Name: Arlen Brice  : 1985  MRN: [id number]  Today's Date: 2017      Visit Date: 2017    Visit Dx:    ICD-10-CM ICD-9-CM   1. Chronic left-sided low back pain without sciatica M54.5 724.2    G89.29 338.29   2. Osteoarthritis of spine with radiculopathy, lumbar region M47.26 721.3       There is no problem list on file for this patient.       Past Medical History:   Diagnosis Date   • Asthma    • Disease of thyroid gland    • Hepatitis C         Past Surgical History:   Procedure Laterality Date   • APPENDECTOMY     •  SECTION      Total of 5   • CHOLECYSTECTOMY                               PT Assessment/Plan       17 0849       PT Assessment    Assessment Comments Pt reports pain before tx at an 8/10 and reports no pain at the end of tx. Treatment focused on STM of the lumbar spine and mobilization of the upper thoracic. Treatment also included hip strengthening and nerve gliding on the LLE with no increase in symptoms.   -EC     PT Plan    PT Plan Comments Continue to improve hip and spinal mobility and correction of posture.   -EC       User Key  (r) = Recorded By, (t) = Taken By, (c) = Cosigned By    Initials Name Provider Type    EC Catarino Choi PTA Physical Therapy Assistant                    Exercises       17 0800          Subjective Comments    Subjective Comments She reports LBP, denies N/T in LE  -EC      Subjective Pain    Able to rate subjective pain? yes  -EC      Pre-Treatment Pain Level 8  -EC      Post-Treatment Pain Level 0  -EC      Exercise 1    Exercise Name 1 Left SLDRI 45cm SB with 5 second hold   -EC      Cueing 1 Verbal;Tactile  -EC      Reps 1 10  -EC      Exercise 2    Exercise Name 2 Thoracic towel stretch   -EC      Cueing 2 Verbal  -EC      Time (Minutes) 2 3  -EC      Exercise 3    Exercise Name 3 B SKC  -EC      Reps 3 3  -EC      Time (Seconds) 3 30  -EC       Exercise 4    Exercise Name 4 B isometric hip adduction  -EC      Equipment 4 --   green ball  -EC      Reps 4 15  -EC      Exercise 5    Exercise Name 5 hooklying clamshells  -EC      Equipment 5 Theraband  -EC      Resistance 5 Red  -EC      Reps 5 15  -EC        User Key  (r) = Recorded By, (t) = Taken By, (c) = Cosigned By    Initials Name Provider Type    EC Catarino Choi PTA Physical Therapy Assistant                        Manual Rx (last 36 hours)      Manual Treatments       04/24/17 0700          Manual Rx 1    Manual Rx 1 Location Prone STM B lower lumbar   -EC      Manual Rx 1 Duration 13  -EC      Manual Rx 2    Manual Rx 2 Location Prone thoracic  -EC      Manual Rx 2 Type Ext mobs to upper and middle  -EC      Manual Rx 2 Grade 2-3 oscillating   -EC      Manual Rx 2 Duration 10  -EC        User Key  (r) = Recorded By, (t) = Taken By, (c) = Cosigned By    Initials Name Provider Type    EC Catarino Choi PTA Physical Therapy Assistant                PT OP Goals       04/24/17 0849       PT Short Term Goals    STG Date to Achieve 05/01/17  -EC     STG 1 Pt will be able to demonstrate a 50% improvement in her thoracolumbar ROM in order to restore overall posturing.    -EC     STG 1 Progress Ongoing  -EC     STG 2 Pt will illustrate improved posture avoiding forward flexed posture and anterior pelvic tilt to promote more functional movements during ADLs.    -EC     STG 2 Progress Ongoing;Progressing  -EC     STG 2 Progress Comments Posture improving still needs correction  -EC     Long Term Goals    LTG Date to Achieve 05/22/17  -EC     LTG 1 Pt will be able to demonstrate full thoracolumbar ROM reporting <2-3/10 pain in order to restore overall posturing and decrease strain placed on lumbar spine.     -EC     LTG 1 Progress Ongoing  -EC     LTG 2 Pt will show full hip PROM painfree in order to improve mechanics of gait and decrease the amount of strain placed on her SIJ and lower lumbar spine.    -EC     LTG 2 Progress Ongoing  -EC     LTG 3 Pt will illustrate gluteus medius strength to 4/5 so that she will be able to demonstrate decreased compensatory strategies during gait and decrease abnormal forces placed through lumbar spine.   -EC     LTG 3 Progress Ongoing  -EC     LTG 4 Pt will demonstrate normalized mechanics of gait avoiding lateral trunk flexion, bilateral knee extension, and increased thoracic rotation.   -EC     LTG 4 Progress Ongoing  -EC     LTG 5 Pt will be able to perform bilateral SLS for >15s in order to show improvements in hip and core control/stability.   -EC     LTG 5 Progress Ongoing  -EC     Time Calculation    PT Goal Re-Cert Due Date 05/10/17  -EC       User Key  (r) = Recorded By, (t) = Taken By, (c) = Cosigned By    Initials Name Provider Type    JUNIOR Choi PTA Physical Therapy Assistant                Therapy Education       04/24/17 0936          Therapy Education    Given Symptoms/condition management  -EC      How Provided Verbal  -EC      Provided to Patient  -EC      Level of Understanding Verbalized  -EC        User Key  (r) = Recorded By, (t) = Taken By, (c) = Cosigned By    Initials Name Provider Type    JUNIOR Choi PTA Physical Therapy Assistant                Time Calculation:   Start Time: 0849  Stop Time: 0930  Time Calculation (min): 41 min  Total Timed Code Minutes- PT: 41 minute(s)    Therapy Charges for Today     Code Description Service Date Service Provider Modifiers Qty    42521110788 HC PT MANUAL THERAPY EA 15 MIN 4/24/2017 Catarino Choi PTA GP 2    97580456501 HC PT THER PROC EA 15 MIN 4/24/2017 Catarino Choi PTA GP 1                    Catarino Choi PTA  4/24/2017

## 2017-04-26 ENCOUNTER — HOSPITAL ENCOUNTER (OUTPATIENT)
Dept: PHYSICAL THERAPY | Facility: HOSPITAL | Age: 32
Setting detail: THERAPIES SERIES
Discharge: HOME | End: 2017-04-26

## 2017-04-26 DIAGNOSIS — M54.50 CHRONIC LEFT-SIDED LOW BACK PAIN WITHOUT SCIATICA: Primary | ICD-10-CM

## 2017-04-26 DIAGNOSIS — G89.29 CHRONIC LEFT-SIDED LOW BACK PAIN WITHOUT SCIATICA: Primary | ICD-10-CM

## 2017-04-26 PROCEDURE — 97110 THERAPEUTIC EXERCISES: CPT

## 2017-04-26 PROCEDURE — 97140 MANUAL THERAPY 1/> REGIONS: CPT

## 2017-04-26 NOTE — PROGRESS NOTES
Outpatient Physical Therapy Ortho Treatment Note  [facility]     Patient Name: Arlen Brice  : 1985  MRN: [id number]  Today's Date: 2017      Visit Date: 2017    Visit Dx:    ICD-10-CM ICD-9-CM   1. Chronic left-sided low back pain without sciatica M54.5 724.2    G89.29 338.29       There is no problem list on file for this patient.       Past Medical History:   Diagnosis Date   • Asthma    • Disease of thyroid gland    • Hepatitis C         Past Surgical History:   Procedure Laterality Date   • APPENDECTOMY     •  SECTION      Total of 5   • CHOLECYSTECTOMY               PT Ortho       17 9340    Subjective Comments    Subjective Comments Pt reports increased pain. She believes it is due to the change in weather. She states that she is still having pain in low back but today it is worse in her upper back.  -TC (r) WJ (t) TC (c)    Subjective Pain    Able to rate subjective pain? yes  -TC (r) WJ (t) TC (c)    Pre-Treatment Pain Level 9  -TC (r) WJ (t) TC (c)      User Key  (r) = Recorded By, (t) = Taken By, (c) = Cosigned By    Initials Name Provider Type    TC Ese Valdovinos, PT Physical Therapist    RENAE Goldstein, PT Student PT Student                            PT Assessment/Plan       17 2753       PT Assessment    Assessment Comments She reported improved pain post STM and mobilizations but pain was still not completely demolished. Her thoracic and L hip mobility continue to be poor, this along with her anterior pelvic tilt continue to exacerbate her symptoms. We will reassess her neural tension next session as well.   -TC     PT Plan    PT Plan Comments Continue to address her thoracic and L hip mobility, address neural tension on L and core/hip activation.   -TC       User Key  (r) = Recorded By, (t) = Taken By, (c) = Cosigned By    Initials Name Provider Type    NATHALIE Valdovinos, PT Physical Therapist                    Exercises       17 2405           Subjective Comments    Subjective Comments Pt reports increased pain. She believes it is due to the change in weather. She states that she is still having pain in low back but today it is worse in her upper back.  -TC (r) WJ (t) TC (c)      Subjective Pain    Able to rate subjective pain? yes  -TC (r) WJ (t) TC (c)      Pre-Treatment Pain Level 9  -TC (r) WJ (t) TC (c)      Exercise 1    Exercise Name 1 hooklying; lower abd contraction with knee flexion on 35cm physioball   -TC (r) WJ (t) TC (c)      Cueing 1 Verbal;Tactile  -TC (r) WJ (t) TC (c)      Sets 1 2  -TC (r) WJ (t) TC (c)      Reps 1 20  -TC (r) WJ (t) TC (c)      Exercise 2    Exercise Name 2 Modified bridge with abdominal hollowing  -TC      Cueing 2 Verbal;Tactile  -TC (r) WJ (t) TC (c)      Sets 2 1  -TC (r) WJ (t) TC (c)      Reps 2 15  -TC (r) WJ (t) TC (c)        User Key  (r) = Recorded By, (t) = Taken By, (c) = Cosigned By    Initials Name Provider Type    TC Ese Valdovinos, PT Physical Therapist    RENAE Goldstein, PT Student PT Student                        Manual Rx (last 36 hours)      Manual Treatments       04/26/17 0932          Manual Rx 1    Manual Rx 1 Location prone thoracic  -TC      Manual Rx 1 Type central PAs to lower and upper   -TC      Manual Rx 1 Grade 2-3  -TC      Manual Rx 1 Duration 10  -TC      Manual Rx 2    Manual Rx 2 Location L hip   -TC      Manual Rx 2 Type lateral and inferior hip glides at 60, 90, and 90 w/ some IR  -TC      Manual Rx 2 Grade 3 (3x45s each jenny)  -TC      Manual Rx 2 Duration 10  -TC      Manual Rx 3    Manual Rx 3 Location LS   -TC      Manual Rx 3 Type distraction   -TC      Manual Rx 3 Grade 3  -TC      Manual Rx 3 Duration 5  -TC      Manual Rx 4    Manual Rx 4 Location STM to thoracolumbar region and sacral region   -TC      Manual Rx 4 Type relieved pain somewhat   -TC      Manual Rx 4 Grade 3  -TC      Manual Rx 4 Duration 5  -TC        User Key  (r) = Recorded By, (t) = Taken  By, (c) = Cosigned By    Initials Name Provider Type    NATHALIE Valdovinos, PT Physical Therapist                PT OP Goals       04/26/17 0932       PT Short Term Goals    STG Date to Achieve 05/01/17  -TC     STG 1 Pt will be able to demonstrate a 50% improvement in her thoracolumbar ROM in order to restore overall posturing.    -TC     STG 1 Progress Ongoing  -TC     STG 2 Pt will illustrate improved posture avoiding forward flexed posture and anterior pelvic tilt to promote more functional movements during ADLs.    -TC     STG 2 Progress Ongoing;Progressing  -TC     STG 3 still requires cues to correct ant pelvic tilt and erect posture   -TC     Long Term Goals    LTG Date to Achieve 05/22/17  -TC     LTG 1 Pt will be able to demonstrate full thoracolumbar ROM reporting <2-3/10 pain in order to restore overall posturing and decrease strain placed on lumbar spine.     -TC     LTG 1 Progress Ongoing  -TC     LTG 2 Pt will show full hip PROM painfree in order to improve mechanics of gait and decrease the amount of strain placed on her SIJ and lower lumbar spine.   -TC     LTG 2 Progress Ongoing  -TC     LTG 2 Progress Comments end range IR elicits LBP   -TC     LTG 3 Pt will illustrate gluteus medius strength to 4/5 so that she will be able to demonstrate decreased compensatory strategies during gait and decrease abnormal forces placed through lumbar spine.   -TC     LTG 3 Progress Ongoing  -TC     LTG 4 Pt will demonstrate normalized mechanics of gait avoiding lateral trunk flexion, bilateral knee extension, and increased thoracic rotation.   -TC     LTG 4 Progress Ongoing  -TC     LTG 5 Pt will be able to perform bilateral SLS for >15s in order to show improvements in hip and core control/stability.   -TC     LTG 5 Progress Ongoing  -TC     Time Calculation    PT Goal Re-Cert Due Date 05/10/17  -TC       User Key  (r) = Recorded By, (t) = Taken By, (c) = Cosigned By    Initials Name Provider Type    NATHALIE Mulligan  Ginette Valdovinos, PT Physical Therapist                Therapy Education       04/26/17 1235          Therapy Education    Given HEP;Symptoms/condition management;Posture/body mechanics  -TC      Program Reinforced  -TC      How Provided Verbal  -TC      Provided to Patient  -TC      Level of Understanding Teach back education performed;Verbalized  -TC        User Key  (r) = Recorded By, (t) = Taken By, (c) = Cosigned By    Initials Name Provider Type    TC Ese Valdovinos, PT Physical Therapist                Time Calculation:   Start Time: 0932  Stop Time: 1016  Time Calculation (min): 44 min  Total Timed Code Minutes- PT: 44 minute(s)    Therapy Charges for Today     Code Description Service Date Service Provider Modifiers Qty    46329828684 HC PT MANUAL THERAPY EA 15 MIN 4/26/2017 Ese Valdovinos, PT GP 2    80265302405 HC PT THER PROC EA 15 MIN 4/26/2017 Ese Valdovinos, PT GP 1                    Ese Valdovinos, PT  4/26/2017

## 2017-04-28 ENCOUNTER — HOSPITAL ENCOUNTER (OUTPATIENT)
Dept: PHYSICAL THERAPY | Facility: HOSPITAL | Age: 32
Setting detail: THERAPIES SERIES
Discharge: HOME | End: 2017-04-28

## 2017-04-28 DIAGNOSIS — M47.26 OSTEOARTHRITIS OF SPINE WITH RADICULOPATHY, LUMBAR REGION: ICD-10-CM

## 2017-04-28 DIAGNOSIS — M54.50 CHRONIC LEFT-SIDED LOW BACK PAIN WITHOUT SCIATICA: Primary | ICD-10-CM

## 2017-04-28 DIAGNOSIS — G89.29 CHRONIC LEFT-SIDED LOW BACK PAIN WITHOUT SCIATICA: Primary | ICD-10-CM

## 2017-04-28 PROCEDURE — 97140 MANUAL THERAPY 1/> REGIONS: CPT

## 2017-04-28 NOTE — PROGRESS NOTES
Outpatient Physical Therapy Ortho Treatment Note  [facility]     Patient Name: Arlen Brice  : 1985  MRN: [id number]  Today's Date: 2017      Visit Date: 2017    Visit Dx:    ICD-10-CM ICD-9-CM   1. Chronic left-sided low back pain without sciatica M54.5 724.2    G89.29 338.29   2. Osteoarthritis of spine with radiculopathy, lumbar region M47.26 721.3       There is no problem list on file for this patient.       Past Medical History:   Diagnosis Date   • Asthma    • Disease of thyroid gland    • Hepatitis C         Past Surgical History:   Procedure Laterality Date   • APPENDECTOMY     •  SECTION      Total of 5   • CHOLECYSTECTOMY               PT Ortho       17 0932    Subjective Comments    Subjective Comments Pt reports increased pain. She believes it is due to the change in weather. She states that she is still having pain in low back but today it is worse in her upper back.  -TC (r) WJ (t) TC (c)    Subjective Pain    Able to rate subjective pain? yes  -TC (r) WJ (t) TC (c)    Pre-Treatment Pain Level 9  -TC (r) WJ (t) TC (c)      User Key  (r) = Recorded By, (t) = Taken By, (c) = Cosigned By    Initials Name Provider Type    TC Ese Valdovinos, PT Physical Therapist    RENAE Goldstein, PT Student PT Student                            PT Assessment/Plan       17 0915       PT Assessment    Assessment Comments Her pain has been more flared due to change of weather.  She has increased pain in bilateral shoulders along with low back.  After sitting up following lumbosacral distraction she had increased pain in low back. After manual STM pain was improved, but not entirely gone.  She continues to have decreased mobility in thoracic spine and left hip, but left hip rotation has improved.  We focused more on manual techniques today due to increase of pain.  -PAOLA     PT Plan    PT Plan Comments Continue to work on thoracic and L hip mobility.   -PAOLA       User Key   (r) = Recorded By, (t) = Taken By, (c) = Cosigned By    Initials Name Provider Type    PAOLA Samano PTA Physical Therapy Assistant                    Exercises       04/28/17 0931          Subjective Comments    Subjective Comments Patient reports this weather has her hurting.  She states the pain is mostly in her shoulders with radiating pain down to bilateral elbows due to arthritis.  She denies any N/T into L leg  -PAOLA      Subjective Pain    Able to rate subjective pain? yes  -PAOLA      Pre-Treatment Pain Level --   9/10 in shoulders, 7/10 lower back  -PAOLA      Post-Treatment Pain Level --   7/10 shoulders, 6/10 low back  -PAOLA      Exercise 1    Exercise Name 1 Educated patient on importance of correcting posture.  -PAOLA        User Key  (r) = Recorded By, (t) = Taken By, (c) = Cosigned By    Initials Name Provider Type    PAOLA Samano PTA Physical Therapy Assistant                        Manual Rx (last 36 hours)      Manual Treatments       04/28/17 0934          Manual Rx 1    Manual Rx 1 Location prone thoracic  -PAOLA      Manual Rx 1 Type central PAs upper and lower, and sustained ext mobs at upper thoracic  -PAOLA      Manual Rx 1 Grade 2-3  -PAOLA      Manual Rx 1 Duration 10  -PAOLA      Manual Rx 2    Manual Rx 2 Location STM to thoracolumbar region and sacral region  -PAOLA      Manual Rx 2 Type blue foam roller  -PAOLA      Manual Rx 2 Duration 5  -PAOLA      Manual Rx 3    Manual Rx 3 Location LS   -PAOLA      Manual Rx 3 Type distraction  -PAOLA      Manual Rx 3 Grade 2-3  -PAOLA      Manual Rx 3 Duration 3   Patient had increased pain after once she sat up  -PAOLA      Manual Rx 4    Manual Rx 4 Location L hip  -PAOLA      Manual Rx 4 Type lateral and inferior hip glides at 60  -PAOLA      Manual Rx 4 Grade 2-3  -PAOLA      Manual Rx 4 Duration 5  -PAOLA      Manual Rx 5    Manual Rx 5 Location manual STM to thoracolumbar region   post hooklying,due to increased pain in hooklying  -PAOLA      Manual Rx 5 Type she reports some relief in  pain  -PAOLA      Manual Rx 5 Grade mod  -PAOLA      Manual Rx 5 Duration 10  -PAOLA      Manual Rx 6    Manual Rx 6 Location L IR stretch at 90 and 100 degrees  -PAOLA      Manual Rx 6 Grade 3x 40s each position  -PAOLA      Manual Rx 6 Duration 5  -PAOLA      Manual Rx 7    Manual Rx 7 Location --  -PAOLA        User Key  (r) = Recorded By, (t) = Taken By, (c) = Cosigned By    Initials Name Provider Type    PAOLA Samano, PTA Physical Therapy Assistant                PT OP Goals       04/28/17 0931       PT Short Term Goals    STG Date to Achieve 05/01/17  -PAOLA     STG 1 Pt will be able to demonstrate a 50% improvement in her thoracolumbar ROM in order to restore overall posturing.    -PAOLA     STG 1 Progress Ongoing  -PAOLA     STG 2 Pt will illustrate improved posture avoiding forward flexed posture and anterior pelvic tilt to promote more functional movements during ADLs.    -PAOLA     STG 2 Progress Ongoing;Progressing  -PAOLA     Long Term Goals    LTG Date to Achieve 05/22/17  -PAOLA     LTG 1 Pt will be able to demonstrate full thoracolumbar ROM reporting <2-3/10 pain in order to restore overall posturing and decrease strain placed on lumbar spine.     -PAOLA     LTG 1 Progress Ongoing  -PAOLA     LTG 2 Pt will show full hip PROM painfree in order to improve mechanics of gait and decrease the amount of strain placed on her SIJ and lower lumbar spine.   -PAOLA     LTG 2 Progress Ongoing  -PAOLA     LTG 2 Progress Comments Her hip mobility is improving, she had no increase of pain during stretches  -PAOLA     LTG 3 Pt will illustrate gluteus medius strength to 4/5 so that she will be able to demonstrate decreased compensatory strategies during gait and decrease abnormal forces placed through lumbar spine.   -PAOLA     LTG 3 Progress Ongoing  -PAOLA     LTG 4 Pt will demonstrate normalized mechanics of gait avoiding lateral trunk flexion, bilateral knee extension, and increased thoracic rotation.   -PAOLA     LTG 4 Progress Ongoing  -PAOLA     LTG 5 Pt will be able  to perform bilateral SLS for >15s in order to show improvements in hip and core control/stability.   -PAOLA     LTG 5 Progress Ongoing  -PAOLA     Time Calculation    PT Goal Re-Cert Due Date 05/10/17  -PAOLA       User Key  (r) = Recorded By, (t) = Taken By, (c) = Cosigned By    Initials Name Provider Type    PAOLA Samano PTA Physical Therapy Assistant                Therapy Education       04/28/17 1302          Therapy Education    Given Posture/body mechanics  -PAOLA      Program Reinforced  -PAOLA      How Provided Verbal  -PAOLA      Provided to Patient  -PAOLA      Level of Understanding Verbalized  -PAOLA        User Key  (r) = Recorded By, (t) = Taken By, (c) = Cosigned By    Initials Name Provider Type    PAOLA Samano PTA Physical Therapy Assistant                Time Calculation:   Start Time: 0931  Stop Time: 1015  Time Calculation (min): 44 min  PT Non-Billable Time (min): 6 min  Total Timed Code Minutes- PT: 38 minute(s)    Therapy Charges for Today     Code Description Service Date Service Provider Modifiers Qty    53805938093 HC PT MANUAL THERAPY EA 15 MIN 4/28/2017 Alexis Samano PTA GP 3                    Alexis Samano PTA  4/28/2017

## 2017-05-01 ENCOUNTER — HOSPITAL ENCOUNTER (OUTPATIENT)
Dept: PHYSICAL THERAPY | Facility: HOSPITAL | Age: 32
Setting detail: THERAPIES SERIES
Discharge: HOME | End: 2017-05-01

## 2017-05-01 DIAGNOSIS — M54.50 CHRONIC LEFT-SIDED LOW BACK PAIN WITHOUT SCIATICA: Primary | ICD-10-CM

## 2017-05-01 DIAGNOSIS — G89.29 CHRONIC LEFT-SIDED LOW BACK PAIN WITHOUT SCIATICA: Primary | ICD-10-CM

## 2017-05-01 DIAGNOSIS — M47.26 OSTEOARTHRITIS OF SPINE WITH RADICULOPATHY, LUMBAR REGION: ICD-10-CM

## 2017-05-01 PROCEDURE — 97110 THERAPEUTIC EXERCISES: CPT | Performed by: PHYSICAL THERAPIST

## 2017-05-01 PROCEDURE — 97140 MANUAL THERAPY 1/> REGIONS: CPT | Performed by: PHYSICAL THERAPIST

## 2017-05-03 ENCOUNTER — HOSPITAL ENCOUNTER (OUTPATIENT)
Dept: PHYSICAL THERAPY | Facility: HOSPITAL | Age: 32
Setting detail: THERAPIES SERIES
Discharge: HOME | End: 2017-05-03

## 2017-05-03 DIAGNOSIS — G89.29 CHRONIC LEFT-SIDED LOW BACK PAIN WITHOUT SCIATICA: Primary | ICD-10-CM

## 2017-05-03 DIAGNOSIS — M54.50 CHRONIC LEFT-SIDED LOW BACK PAIN WITHOUT SCIATICA: Primary | ICD-10-CM

## 2017-05-03 DIAGNOSIS — M47.26 OSTEOARTHRITIS OF SPINE WITH RADICULOPATHY, LUMBAR REGION: ICD-10-CM

## 2017-05-03 PROCEDURE — 97140 MANUAL THERAPY 1/> REGIONS: CPT | Performed by: PHYSICAL THERAPIST

## 2017-05-03 PROCEDURE — 97110 THERAPEUTIC EXERCISES: CPT | Performed by: PHYSICAL THERAPIST

## 2017-05-05 ENCOUNTER — HOSPITAL ENCOUNTER (OUTPATIENT)
Dept: PHYSICAL THERAPY | Facility: HOSPITAL | Age: 32
Setting detail: THERAPIES SERIES
Discharge: HOME | End: 2017-05-05

## 2017-05-05 DIAGNOSIS — M47.26 OSTEOARTHRITIS OF SPINE WITH RADICULOPATHY, LUMBAR REGION: ICD-10-CM

## 2017-05-05 DIAGNOSIS — M54.50 CHRONIC LEFT-SIDED LOW BACK PAIN WITHOUT SCIATICA: Primary | ICD-10-CM

## 2017-05-05 DIAGNOSIS — G89.29 CHRONIC LEFT-SIDED LOW BACK PAIN WITHOUT SCIATICA: Primary | ICD-10-CM

## 2017-05-05 PROCEDURE — 97140 MANUAL THERAPY 1/> REGIONS: CPT

## 2017-05-05 PROCEDURE — G8979 MOBILITY GOAL STATUS: HCPCS

## 2017-05-05 PROCEDURE — G8978 MOBILITY CURRENT STATUS: HCPCS

## 2017-05-05 PROCEDURE — 97110 THERAPEUTIC EXERCISES: CPT

## 2017-05-08 ENCOUNTER — HOSPITAL ENCOUNTER (OUTPATIENT)
Dept: PHYSICAL THERAPY | Facility: HOSPITAL | Age: 32
Setting detail: THERAPIES SERIES
Discharge: HOME | End: 2017-05-08

## 2017-05-08 DIAGNOSIS — M54.50 CHRONIC LEFT-SIDED LOW BACK PAIN WITHOUT SCIATICA: Primary | ICD-10-CM

## 2017-05-08 DIAGNOSIS — M47.26 OSTEOARTHRITIS OF SPINE WITH RADICULOPATHY, LUMBAR REGION: ICD-10-CM

## 2017-05-08 DIAGNOSIS — G89.29 CHRONIC LEFT-SIDED LOW BACK PAIN WITHOUT SCIATICA: Primary | ICD-10-CM

## 2017-05-08 PROCEDURE — 97140 MANUAL THERAPY 1/> REGIONS: CPT | Performed by: PHYSICAL THERAPIST

## 2017-05-08 PROCEDURE — 97110 THERAPEUTIC EXERCISES: CPT | Performed by: PHYSICAL THERAPIST

## 2017-05-10 ENCOUNTER — APPOINTMENT (OUTPATIENT)
Dept: PHYSICAL THERAPY | Facility: HOSPITAL | Age: 32
End: 2017-05-10

## 2017-05-12 ENCOUNTER — HOSPITAL ENCOUNTER (OUTPATIENT)
Dept: PHYSICAL THERAPY | Facility: HOSPITAL | Age: 32
Setting detail: THERAPIES SERIES
Discharge: HOME | End: 2017-05-12

## 2017-05-12 DIAGNOSIS — M47.26 OSTEOARTHRITIS OF SPINE WITH RADICULOPATHY, LUMBAR REGION: ICD-10-CM

## 2017-05-12 DIAGNOSIS — G89.29 CHRONIC LEFT-SIDED LOW BACK PAIN WITHOUT SCIATICA: Primary | ICD-10-CM

## 2017-05-12 DIAGNOSIS — M54.50 CHRONIC LEFT-SIDED LOW BACK PAIN WITHOUT SCIATICA: Primary | ICD-10-CM

## 2017-05-12 PROCEDURE — 97110 THERAPEUTIC EXERCISES: CPT

## 2017-05-12 PROCEDURE — 97140 MANUAL THERAPY 1/> REGIONS: CPT

## 2017-05-15 ENCOUNTER — HOSPITAL ENCOUNTER (OUTPATIENT)
Dept: PHYSICAL THERAPY | Facility: HOSPITAL | Age: 32
Setting detail: THERAPIES SERIES
Discharge: HOME | End: 2017-05-15

## 2017-05-15 DIAGNOSIS — G89.29 CHRONIC LEFT-SIDED LOW BACK PAIN WITHOUT SCIATICA: Primary | ICD-10-CM

## 2017-05-15 DIAGNOSIS — M47.26 OSTEOARTHRITIS OF SPINE WITH RADICULOPATHY, LUMBAR REGION: ICD-10-CM

## 2017-05-15 DIAGNOSIS — M54.50 CHRONIC LEFT-SIDED LOW BACK PAIN WITHOUT SCIATICA: Primary | ICD-10-CM

## 2017-05-15 PROCEDURE — 97140 MANUAL THERAPY 1/> REGIONS: CPT

## 2017-05-15 PROCEDURE — 97110 THERAPEUTIC EXERCISES: CPT

## 2017-05-17 ENCOUNTER — HOSPITAL ENCOUNTER (OUTPATIENT)
Dept: PHYSICAL THERAPY | Facility: HOSPITAL | Age: 32
Setting detail: THERAPIES SERIES
Discharge: HOME | End: 2017-05-17

## 2017-05-17 DIAGNOSIS — M54.50 CHRONIC LEFT-SIDED LOW BACK PAIN WITHOUT SCIATICA: Primary | ICD-10-CM

## 2017-05-17 DIAGNOSIS — G89.29 CHRONIC LEFT-SIDED LOW BACK PAIN WITHOUT SCIATICA: Primary | ICD-10-CM

## 2017-05-17 DIAGNOSIS — M47.26 OSTEOARTHRITIS OF SPINE WITH RADICULOPATHY, LUMBAR REGION: ICD-10-CM

## 2017-05-17 PROCEDURE — G0283 ELEC STIM OTHER THAN WOUND: HCPCS

## 2017-05-17 PROCEDURE — 97140 MANUAL THERAPY 1/> REGIONS: CPT

## 2017-05-17 PROCEDURE — 97110 THERAPEUTIC EXERCISES: CPT

## 2017-05-19 ENCOUNTER — APPOINTMENT (OUTPATIENT)
Dept: PHYSICAL THERAPY | Facility: HOSPITAL | Age: 32
End: 2017-05-19

## 2017-05-23 ENCOUNTER — HOSPITAL ENCOUNTER (OUTPATIENT)
Dept: PHYSICAL THERAPY | Facility: HOSPITAL | Age: 32
Setting detail: THERAPIES SERIES
Discharge: HOME | End: 2017-05-23

## 2017-05-23 DIAGNOSIS — M54.50 CHRONIC LEFT-SIDED LOW BACK PAIN WITHOUT SCIATICA: Primary | ICD-10-CM

## 2017-05-23 DIAGNOSIS — G89.29 CHRONIC LEFT-SIDED LOW BACK PAIN WITHOUT SCIATICA: Primary | ICD-10-CM

## 2017-05-23 DIAGNOSIS — M47.26 OSTEOARTHRITIS OF SPINE WITH RADICULOPATHY, LUMBAR REGION: ICD-10-CM

## 2017-05-23 PROCEDURE — 97140 MANUAL THERAPY 1/> REGIONS: CPT

## 2017-05-23 PROCEDURE — 97110 THERAPEUTIC EXERCISES: CPT

## 2017-05-30 ENCOUNTER — HOSPITAL ENCOUNTER (OUTPATIENT)
Dept: PHYSICAL THERAPY | Facility: HOSPITAL | Age: 32
Setting detail: THERAPIES SERIES
Discharge: HOME | End: 2017-05-30

## 2017-05-30 DIAGNOSIS — G89.29 CHRONIC LEFT-SIDED LOW BACK PAIN WITHOUT SCIATICA: Primary | ICD-10-CM

## 2017-05-30 DIAGNOSIS — M54.50 CHRONIC LEFT-SIDED LOW BACK PAIN WITHOUT SCIATICA: Primary | ICD-10-CM

## 2017-05-30 DIAGNOSIS — M47.26 OSTEOARTHRITIS OF SPINE WITH RADICULOPATHY, LUMBAR REGION: ICD-10-CM

## 2017-05-30 PROCEDURE — 97140 MANUAL THERAPY 1/> REGIONS: CPT

## 2017-05-30 PROCEDURE — 97110 THERAPEUTIC EXERCISES: CPT

## 2017-06-02 ENCOUNTER — HOSPITAL ENCOUNTER (OUTPATIENT)
Dept: PHYSICAL THERAPY | Facility: HOSPITAL | Age: 32
Setting detail: THERAPIES SERIES
Discharge: HOME | End: 2017-06-02

## 2017-06-02 DIAGNOSIS — M54.50 CHRONIC LEFT-SIDED LOW BACK PAIN WITHOUT SCIATICA: Primary | ICD-10-CM

## 2017-06-02 DIAGNOSIS — G89.29 CHRONIC LEFT-SIDED LOW BACK PAIN WITHOUT SCIATICA: Primary | ICD-10-CM

## 2017-06-02 DIAGNOSIS — M47.26 OSTEOARTHRITIS OF SPINE WITH RADICULOPATHY, LUMBAR REGION: ICD-10-CM

## 2017-06-02 PROCEDURE — 97110 THERAPEUTIC EXERCISES: CPT

## 2017-06-02 PROCEDURE — 97140 MANUAL THERAPY 1/> REGIONS: CPT

## 2017-06-02 NOTE — THERAPY TREATMENT NOTE
"    Outpatient Physical Therapy Ortho Treatment Note  [facility]     Patient Name: Arlen Brice  : 1985  MRN: [id number]  Today's Date: 2017      Visit Date: 2017    Visit Dx:    ICD-10-CM ICD-9-CM   1. Chronic left-sided low back pain without sciatica M54.5 724.2    G89.29 338.29   2. Osteoarthritis of spine with radiculopathy, lumbar region M47.26 721.3       There is no problem list on file for this patient.       Past Medical History:   Diagnosis Date   • Asthma    • Disease of thyroid gland    • Hepatitis C         Past Surgical History:   Procedure Laterality Date   • APPENDECTOMY     •  SECTION      Total of 5   • CHOLECYSTECTOMY                               PT Assessment/Plan       17 1349       PT Assessment    Assessment Comments Patient continues to report she has not had a flare up or numbness/tingling into left leg.  She is currently only having dull ache in lower back.  She was able to progress with more exercises today without increase of symptoms.  She did report a \"twinge\" of pain at end of treatment, but this subsided after sitting in straight back chair.  -PAOLA     PT Plan    PT Plan Comments We will continue to progress with more core and hip strengthening without increasing symptoms.  -PAOLA       User Key  (r) = Recorded By, (t) = Taken By, (c) = Cosigned By    Initials Name Provider Type    PAOLA Samano, DUSTIN Physical Therapy Assistant                    Exercises       17 1345          Subjective Comments    Subjective Comments Patient denies any flare up since last treatment.  She reports when she starts to feel production of pain in lower back she sits down quickly. She denies any N/T or pain down the left leg.  -PAOLA      Subjective Pain    Able to rate subjective pain? yes  -PAOLA      Pre-Treatment Pain Level 4   dull lower back  -PAOLA      Post-Treatment Pain Level 3   dull lower back  -PAOLA      Exercise 1    Exercise Name 1 PPT's  -PAOLA      Cueing 1 " "Verbal;Tactile  -PAOLA      Sets 1 2  -PAOLA      Reps 1 10  -PAOLA      Time (Seconds) 1 5s holds  -PAOLA      Exercise 2    Exercise Name 2 B hooklying heel slides  -PAOLA      Cueing 2 Verbal;Tactile  -PAOLA      Sets 2 2   each  -PAOLA      Reps 2 10  -PAOLA      Exercise 3    Exercise Name 3 seated hip abduction   added to HEP  -PAOLA      Cueing 3 Verbal;Tactile  -PAOLA      Equipment 3 Theraband  -PAOLA      Resistance 3 Red  -PAOLA      Sets 3 2  -PAOLA      Reps 3 10  -PAOLA      Exercise 4    Exercise Name 4 seated marches  -PAOLA      Cueing 4 Verbal;Tactile  -PAOLA      Equipment 4 Theraband  -PAOLA      Resistance 4 Red  -PAOLA      Sets 4 2  -PAOLA      Reps 4 10  -PAOLA      Exercise 5    Exercise Name 5 Patient had \"twinge\" of pain in lower back after seated marches, ,patient sat in straight back chair, and \"twinge\" subsided   -PAOLA        User Key  (r) = Recorded By, (t) = Taken By, (c) = Cosigned By    Initials Name Provider Type    PAOLA Samano PTA Physical Therapy Assistant                        Manual Rx (last 36 hours)      Manual Treatments       06/02/17 1351          Manual Rx 1    Manual Rx 1 Location R ITB: sidelying with pillow between legs  -PAOLA      Manual Rx 1 Type STM with blue foam roller  -PAOLA      Manual Rx 1 Duration 5  -PAOLA      Manual Rx 2    Manual Rx 2 Location thoracic  -PAOLA      Manual Rx 2 Type prone extension mobilizations  -PAOLA      Manual Rx 2 Grade 2-3 with oscillations  -PAOLA      Manual Rx 2 Duration 10  -PAOLA      Manual Rx 3    Manual Rx 3 Location Lower lumbar  -PAOLA      Manual Rx 3 Type STM blue foam roller  -PAOLA      Manual Rx 3 Duration 6  -PAOLA        User Key  (r) = Recorded By, (t) = Taken By, (c) = Cosigned By    Initials Name Provider Type    PAOLA Samano PTA Physical Therapy Assistant                PT OP Goals       06/02/17 1349       PT Short Term Goals    STG Date to Achieve 05/22/17  -PAOLA     STG 1 Pt will be able to demonstrate a 50% improvement in her thoracolumbar ROM in order to restore overall posturing. "    -PAOLA     STG 1 Progress Met  -PAOLA     STG 2 Pt will illustrate improved posture avoiding forward flexed posture and anterior pelvic tilt to promote more functional movements during ADLs.    -PAOLA     STG 2 Progress Ongoing;Progressing  -PAOLA     STG 2 Progress Comments needs cueing   -PAOLA     Long Term Goals    LTG Date to Achieve 06/07/17  -PAOLA     LTG 1 Pt will be able to demonstrate full thoracolumbar ROM reporting <2-3/10 pain in order to restore overall posturing and decrease strain placed on lumbar spine.     -PAOLA     LTG 1 Progress Ongoing  -PAOLA     LTG 2 Pt will show full hip PROM painfree in order to improve mechanics of gait and decrease the amount of strain placed on her SIJ and lower lumbar spine.   -PAOLA     LTG 2 Progress Met  -PAOLA     LTG 3 Pt will illustrate gluteus medius strength to 4/5 so that she will be able to demonstrate decreased compensatory strategies during gait and decrease abnormal forces placed through lumbar spine.   -PAOLA     LTG 3 Progress Progressing;Partially Met;Ongoing  -PAOLA     LTG 3 Progress Comments We progressed with more strengthening today without increase in symptoms  -PAOLA     LTG 4 Pt will demonstrate normalized mechanics of gait avoiding lateral trunk flexion, bilateral knee extension, and increased thoracic rotation.   -PAOLA     LTG 4 Progress Ongoing  -PAOLA     LTG 5 Pt will be able to perform bilateral SLS for >15s in order to show improvements in hip and core control/stability.   -PAOLA     LTG 5 Progress Ongoing;Progressing  -PAOLA     Time Calculation    PT Goal Re-Cert Due Date 06/07/17  -PAOLA       User Key  (r) = Recorded By, (t) = Taken By, (c) = Cosigned By    Initials Name Provider Type    PAOLA Samano, DUSTIN Physical Therapy Assistant                Therapy Education       06/02/17 1626          Therapy Education    Given HEP  -PAOLA      Program New   seated hip abduction with red theraband  -PAOLA      How Provided Verbal;Written  -PAOLA      Provided to Patient  -PAOLA      Level of  Understanding Verbalized;Demonstrated  -PAOLA        User Key  (r) = Recorded By, (t) = Taken By, (c) = Cosigned By    Initials Name Provider Type    PAOLA Samano PTA Physical Therapy Assistant                Time Calculation:   Start Time: 1349  Stop Time: 1436  Time Calculation (min): 47 min  PT Non-Billable Time (min): 7 min  Total Timed Code Minutes- PT: 40 minute(s)    Therapy Charges for Today     Code Description Service Date Service Provider Modifiers Qty    81643097132 HC PT MANUAL THERAPY EA 15 MIN 6/2/2017 Alxeis Samano PTA GP 1    30456686762 HC PT THER PROC EA 15 MIN 6/2/2017 Alexis Samano PTA GP 2                    Alexis Samano PTA  6/2/2017

## 2017-06-06 ENCOUNTER — HOSPITAL ENCOUNTER (OUTPATIENT)
Dept: PHYSICAL THERAPY | Facility: HOSPITAL | Age: 32
Setting detail: THERAPIES SERIES
Discharge: HOME | End: 2017-06-06

## 2017-06-06 DIAGNOSIS — M47.26 OSTEOARTHRITIS OF SPINE WITH RADICULOPATHY, LUMBAR REGION: ICD-10-CM

## 2017-06-06 DIAGNOSIS — M54.50 CHRONIC LEFT-SIDED LOW BACK PAIN WITHOUT SCIATICA: Primary | ICD-10-CM

## 2017-06-06 DIAGNOSIS — G89.29 CHRONIC LEFT-SIDED LOW BACK PAIN WITHOUT SCIATICA: Primary | ICD-10-CM

## 2017-06-06 PROCEDURE — 97140 MANUAL THERAPY 1/> REGIONS: CPT

## 2017-06-06 PROCEDURE — 97110 THERAPEUTIC EXERCISES: CPT

## 2017-06-06 NOTE — THERAPY PROGRESS REPORT/RE-CERT
Outpatient Physical Therapy Ortho Progress Note   [place]     Patient Name: Arlen Brice  : 1985  MRN: [id number]  Today's Date: 2017      Visit Date: 2017    Visit Dx:    ICD-10-CM ICD-9-CM   1. Chronic left-sided low back pain without sciatica M54.5 724.2    G89.29 338.29   2. Osteoarthritis of spine with radiculopathy, lumbar region M47.26 721.3       There is no problem list on file for this patient.       Past Medical History:   Diagnosis Date   • Asthma    • Disease of thyroid gland    • Hepatitis C         Past Surgical History:   Procedure Laterality Date   • APPENDECTOMY     •  SECTION      Total of 5   • CHOLECYSTECTOMY                               PT Assessment/Plan       17 1500 17 1204    PT Assessment    Functional Limitations Impaired gait;Limitation in home management;Limitations in community activities;Performance in leisure activities  -KR     Impairments Balance;Gait;Muscle strength;Pain;Posture;Range of motion  -KR     Assessment Comments She has met 3/7 goals and is progressing towards others. Overall she is reporting less pain, but still higher pain ratings. She is still demonstrating decreased hip/core strength, but part of this is habitiual and secondary to increased adipose tissue. We will continue to see her for a few more visits, but may need to discharge if we start to plateau  and make minimal progress. We may be slightly limited secondary to chronicity of condition as well.   -KR She has achieved her SLS goal and although she had increased LBP today as compared to the last two sessions she attributes this to the recent rain. Her B lateral  trunk sway with anteriorly tilted pelvis, and B Trendeleberg may be as much habitual as it is weakness. She continues to progress but due to the chronic nature of her condition, her  weak core and B hips as well as her being overweight  we may have difficulty abolishing  her symptoms  -EC    Please  refer to paper survey for additional self-reported information Yes  -KR     Rehab Potential Good  -KR     Patient/caregiver participated in establishment of treatment plan and goals Yes  -KR     Patient would benefit from skilled therapy intervention Yes  -KR     PT Plan    PT Frequency 1x/week;2x/week  -KR     Predicted Duration of Therapy Intervention (days/wks) 4 weeks  -KR     Planned CPT's? PT THER PROC EA 15 MIN: 13039;PT MANUAL THERAPY EA 15 MIN: 11018;PT THER ACT EA 15 MIN: 98299;PT NEUROMUSC RE-EDUCATION EA 15 MIN: 09944;PT GAIT TRAINING EA 15 MIN: 59647;PT ELECTRICAL STIM UNATTEND: ;PT ELECTRICAL STIM ATTD EA 15 MIN: 14715  -KR     PT Plan Comments See assessment. Continue to work hip/core strength and progress HEP towards d/c.   -KR Continue hip and core strengthening.  -EC      User Key  (r) = Recorded By, (t) = Taken By, (c) = Cosigned By    Initials Name Provider Type    EC Catarino Choi PTA Physical Therapy Assistant    CARLOS ALBERTO Chatterjee, PT DPT Physical Therapist                    Exercises       06/06/17 1100          Subjective Comments    Subjective Comments Pt rreports LBP and an increase due to the weather and the rain  -EC      Subjective Pain    Able to rate subjective pain? yes  -EC      Pre-Treatment Pain Level 7  -EC      Exercise 1    Exercise Name 1 B sidelying hip ext/abd  -EC      Reps 1 10  -EC      Time (Seconds) 1 5s holds  -EC      Exercise 2    Exercise Name 2 SLFO's  -EC      Reps 2 20  -EC      Exercise 3    Exercise Name 3 PPT's  -EC      Reps 3 20  -EC      Exercise 4    Exercise Name 4 [facility] wipers  -EC      Equipment 4 --   45 cm swiss ball  -EC      Sets 4 2  -EC      Reps 4 10  -EC      Exercise 5    Exercise Name 5 reviewed goals for recertification (see also goals section)  -EC        User Key  (r) = Recorded By, (t) = Taken By, (c) = Cosigned By    Initials Name Provider Type    EC Catarino Choi PTA Physical Therapy Assistant                         Manual Rx (last 36 hours)      Manual Treatments       06/06/17 1100          Manual Rx 1    Manual Rx 1 Location B lower lumbar  -EC      Manual Rx 1 Type prone STM and DFR  -EC      Manual Rx 1 Duration 15  -EC        User Key  (r) = Recorded By, (t) = Taken By, (c) = Cosigned By    Initials Name Provider Type    JUNIOR Choi, DUSTIN Physical Therapy Assistant                PT OP Goals       06/06/17 1122 06/06/17 1100    PT Short Term Goals    STG Date to Achieve  06/22/17  -EC    STG 1  Pt will be able to demonstrate a 50% improvement in her thoracolumbar ROM in order to restore overall posturing.    -EC    STG 1 Progress  Met  -EC    STG 2  Pt will illustrate improved posture avoiding forward flexed posture and anterior pelvic tilt to promote more functional movements during ADLs.    -EC    STG 2 Progress  Ongoing  -EC    STG 2 Progress Comments  anteriorly tilted pelvic orientation  -EC    Long Term Goals    LTG Date to Achieve  07/06/17  -EC    LTG 1  Pt will be able to demonstrate full thoracolumbar ROM reporting <2-3/10 pain in order to restore overall posturing and decrease strain placed on lumbar spine.     -EC    LTG 1 Progress  Ongoing  -EC    LTG 1 Progress Comments  not tested today due to increased pain  -EC    LTG 2  Pt will show full hip PROM painfree in order to improve mechanics of gait and decrease the amount of strain placed on her SIJ and lower lumbar spine.   -EC    LTG 2 Progress  Met  -EC    LTG 3  Pt will illustrate gluteus medius strength to 4/5 so that she will be able to demonstrate decreased compensatory strategies during gait and decrease abnormal forces placed through lumbar spine.   -EC    LTG 3 Progress  Partially Met  -EC    LTG 3 Progress Comments  4-/5 B LE  -EC    LTG 4  Pt will demonstrate normalized mechanics of gait avoiding lateral trunk flexion, bilateral knee extension, and increased thoracic rotation.   -EC    LTG 4 Progress  Ongoing  -EC    LTG 4 Progress  Comments  no thoracic rotation, but B Trendelenberg and lateral trunk sway persist  -EC    LTG 5  Pt will be able to perform bilateral SLS for >15s in order to show improvements in hip and core control/stability.   -EC    LTG 5 Progress  Met  -EC    LTG 5 Progress Comments  15 sec B LE  -EC    Time Calculation    PT Goal Re-Cert Due Date 07/06/17  -EC 07/06/17  -EC      User Key  (r) = Recorded By, (t) = Taken By, (c) = Cosigned By    Initials Name Provider Type    EC Catarino Choi PTA Physical Therapy Assistant                Therapy Education       06/06/17 1203          Therapy Education    Given Symptoms/condition management;Posture/body mechanics   keeping pelvis level and elimination of lateral trunk sway  -EC      How Provided Verbal  -EC      Provided to Patient  -EC      Level of Understanding Other (comment)   need additional reinforcement  -EC        User Key  (r) = Recorded By, (t) = Taken By, (c) = Cosigned By    Initials Name Provider Type    EC Catarino Choi PTA Physical Therapy Assistant                Time Calculation:   Start Time: 1117  Stop Time: 1159  Time Calculation (min): 42 min  Total Timed Code Minutes- PT: 42 minute(s)                  Giuliana Chatterjee, PT DPT  6/6/2017

## 2017-06-06 NOTE — THERAPY TREATMENT NOTE
Outpatient Physical Therapy Ortho Treatment Note  [facility]     Patient Name: Arlen Brice  : 1985  MRN: [id number]  Today's Date: 2017      Visit Date: 2017    Visit Dx:    ICD-10-CM ICD-9-CM   1. Chronic left-sided low back pain without sciatica M54.5 724.2    G89.29 338.29   2. Osteoarthritis of spine with radiculopathy, lumbar region M47.26 721.3       There is no problem list on file for this patient.       Past Medical History:   Diagnosis Date   • Asthma    • Disease of thyroid gland    • Hepatitis C         Past Surgical History:   Procedure Laterality Date   • APPENDECTOMY     •  SECTION      Total of 5   • CHOLECYSTECTOMY                               PT Assessment/Plan       17 1204       PT Assessment    Assessment Comments She has achieved her SLS goal and although she had increased LBP today as compared to the last two sessions she attributes this to the recent rain. Her B lateral  trunk sway with anteriorly tilted pelvis, and B Trendeleberg may be as much habitual as it is weakness. She continues to progress but due to the chronic nature of her condition, her  weak core and B hips as well as her being overweight  we may have difficulty abolishing  her symptoms  -EC     PT Plan    PT Plan Comments Continue hip and core strengthening.  -EC       User Key  (r) = Recorded By, (t) = Taken By, (c) = Cosigned By    Initials Name Provider Type    EC Catarino Choi PTA Physical Therapy Assistant                    Exercises       17 1100          Subjective Comments    Subjective Comments Pt rreports LBP and an increase due to the weather and the rain  -EC      Subjective Pain    Able to rate subjective pain? yes  -EC      Pre-Treatment Pain Level 7  -EC      Exercise 1    Exercise Name 1 B sidelying hip ext/abd  -EC      Reps 1 10  -EC      Time (Seconds) 1 5s holds  -EC      Exercise 2    Exercise Name 2 SLFO's  -EC      Reps 2 20  -EC      Exercise 3     Exercise Name 3 PPT's  -EC      Reps 3 20  -EC      Exercise 4    Exercise Name 4 [facility] wipers  -EC      Equipment 4 --   45 cm swiss ball  -EC      Sets 4 2  -EC      Reps 4 10  -EC      Exercise 5    Exercise Name 5 reviewed goals for recertification (see also goals section)  -EC        User Key  (r) = Recorded By, (t) = Taken By, (c) = Cosigned By    Initials Name Provider Type    EC Catarino Choi PTA Physical Therapy Assistant                        Manual Rx (last 36 hours)      Manual Treatments       06/06/17 1100          Manual Rx 1    Manual Rx 1 Location B lower lumbar  -EC      Manual Rx 1 Type prone STM and DFR  -EC      Manual Rx 1 Duration 15  -EC        User Key  (r) = Recorded By, (t) = Taken By, (c) = Cosigned By    Initials Name Provider Type    EC Catarino Choi PTA Physical Therapy Assistant                PT OP Goals       06/06/17 1122 06/06/17 1100    PT Short Term Goals    STG Date to Achieve  06/22/17  -EC    STG 1  Pt will be able to demonstrate a 50% improvement in her thoracolumbar ROM in order to restore overall posturing.    -EC    STG 1 Progress  Met  -EC    STG 2  Pt will illustrate improved posture avoiding forward flexed posture and anterior pelvic tilt to promote more functional movements during ADLs.    -EC    STG 2 Progress  Ongoing  -EC    STG 2 Progress Comments  anteriorly tilted pelvic orientation  -EC    Long Term Goals    LTG Date to Achieve  07/06/17  -EC    LTG 1  Pt will be able to demonstrate full thoracolumbar ROM reporting <2-3/10 pain in order to restore overall posturing and decrease strain placed on lumbar spine.     -EC    LTG 1 Progress  Ongoing  -EC    LTG 1 Progress Comments  not tested today due to increased pain  -EC    LTG 2  Pt will show full hip PROM painfree in order to improve mechanics of gait and decrease the amount of strain placed on her SIJ and lower lumbar spine.   -EC    LTG 2 Progress  Met  -EC    LTG 3  Pt will illustrate gluteus  medius strength to 4/5 so that she will be able to demonstrate decreased compensatory strategies during gait and decrease abnormal forces placed through lumbar spine.   -EC    LTG 3 Progress  Partially Met  -EC    LTG 3 Progress Comments  4-/5 B LE  -EC    LTG 4  Pt will demonstrate normalized mechanics of gait avoiding lateral trunk flexion, bilateral knee extension, and increased thoracic rotation.   -EC    LTG 4 Progress  Ongoing  -EC    LTG 4 Progress Comments  no thoracic rotation, but B Trendelenberg and lateral trunk sway persist  -EC    LTG 5  Pt will be able to perform bilateral SLS for >15s in order to show improvements in hip and core control/stability.   -EC    LTG 5 Progress  Met  -EC    LTG 5 Progress Comments  15 sec B LE  -EC    Time Calculation    PT Goal Re-Cert Due Date 07/06/17  -EC 07/06/17  -EC      User Key  (r) = Recorded By, (t) = Taken By, (c) = Cosigned By    Initials Name Provider Type    JUNIOR Choi PTA Physical Therapy Assistant                Therapy Education       06/06/17 1203          Therapy Education    Given Symptoms/condition management;Posture/body mechanics   keeping pelvis level and elimination of lateral trunk sway  -EC      How Provided Verbal  -EC      Provided to Patient  -EC      Level of Understanding Other (comment)   need additional reinforcement  -EC        User Key  (r) = Recorded By, (t) = Taken By, (c) = Cosigned By    Initials Name Provider Type    EC Catarino Choi PTA Physical Therapy Assistant                Time Calculation:   Start Time: 1117  Stop Time: 1159  Time Calculation (min): 42 min  Total Timed Code Minutes- PT: 42 minute(s)    Therapy Charges for Today     Code Description Service Date Service Provider Modifiers Qty    81298524191 HC PT THER PROC EA 15 MIN 6/6/2017 Catarino Choi PTA GP 2    28864360214 HC PT MANUAL THERAPY EA 15 MIN 6/6/2017 Catarino Choi PTA GP 1                    Catarino Choi PTA  6/6/2017

## 2017-06-08 ENCOUNTER — HOSPITAL ENCOUNTER (OUTPATIENT)
Dept: PHYSICAL THERAPY | Facility: HOSPITAL | Age: 32
Setting detail: THERAPIES SERIES
Discharge: HOME | End: 2017-06-08

## 2017-06-08 DIAGNOSIS — M47.26 OSTEOARTHRITIS OF SPINE WITH RADICULOPATHY, LUMBAR REGION: ICD-10-CM

## 2017-06-08 DIAGNOSIS — M54.50 CHRONIC LEFT-SIDED LOW BACK PAIN WITHOUT SCIATICA: Primary | ICD-10-CM

## 2017-06-08 DIAGNOSIS — G89.29 CHRONIC LEFT-SIDED LOW BACK PAIN WITHOUT SCIATICA: Primary | ICD-10-CM

## 2017-06-08 PROCEDURE — G8978 MOBILITY CURRENT STATUS: HCPCS

## 2017-06-08 PROCEDURE — 97140 MANUAL THERAPY 1/> REGIONS: CPT

## 2017-06-08 PROCEDURE — G8979 MOBILITY GOAL STATUS: HCPCS

## 2017-06-08 PROCEDURE — 97110 THERAPEUTIC EXERCISES: CPT

## 2017-06-08 NOTE — THERAPY TREATMENT NOTE
"    Outpatient Physical Therapy Ortho Treatment Note  [facility]     Patient Name: Arlen Brice  : 1985  MRN: [id number]  Today's Date: 2017      Visit Date: 2017    Visit Dx:    ICD-10-CM ICD-9-CM   1. Chronic left-sided low back pain without sciatica M54.5 724.2    G89.29 338.29   2. Osteoarthritis of spine with radiculopathy, lumbar region M47.26 721.3       There is no problem list on file for this patient.       Past Medical History:   Diagnosis Date   • Asthma    • Disease of thyroid gland    • Hepatitis C         Past Surgical History:   Procedure Laterality Date   • APPENDECTOMY     •  SECTION      Total of 5   • CHOLECYSTECTOMY                               PT Assessment/Plan       17 1507       PT Assessment    Assessment Comments Pt is making progress towards her functional goals but not her pain goals. She still demonstrates poor core and hip strength and lumbosacral ROM. Her posture continues to be poor as well furthe exacerbating her symptoms. She reports compliance with HEP. I believe we can achieve a few more functional goals but believe we may be reaching a plateau. She was flared today after cleaning and this was a set back.    -TC     PT Plan    PT Plan Comments Continue to address ROM, core and hip control in WBing positions as she is able to tolerate.   -TC       User Key  (r) = Recorded By, (t) = Taken By, (c) = Cosigned By    Initials Name Provider Type    TC Ese Valdovinos, PT DPT Physical Therapist                    Exercises       17 0631          Subjective Comments    Subjective Comments Pt reports that her pain levels are up today but she has been cleaning.   -TC      Subjective Pain    Able to rate subjective pain? yes  -TC      Pre-Treatment Pain Level 7  -TC      Exercise 1    Exercise Name 1 lumbar flexion standing with pelvis stabilized.    began to \"twinge her back\" she reported and therefore stoppe  -TC      Cueing 1 " Verbal;Tactile;Demo  -TC      Sets 1 2  -TC      Reps 1 10  -TC      Time (Seconds) 1 reported pain so bad she needed to do exercises not standing   -TC      Exercise 2    Exercise Name 2 sidelying hip ext/ abd on 55cm physioball   -TC      Cueing 2 Verbal;Tactile;Demo  -TC      Sets 2 3  -TC      Reps 2 10  -TC      Exercise 3    Exercise Name 3 seated on 75cm; lower abd contraction   -TC      Cueing 3 Verbal;Tactile;Demo  -TC      Sets 3 3  -TC      Reps 3 10  -TC      Exercise 4    Exercise Name 4 seated 75cm physioball; jenny UE phasic   -TC      Cueing 4 Verbal;Tactile;Demo  -TC      Sets 4 3  -TC      Reps 4 10  -TC        User Key  (r) = Recorded By, (t) = Taken By, (c) = Cosigned By    Initials Name Provider Type    TC Ese Valdovinos, PT DPT Physical Therapist                        Manual Rx (last 36 hours)      Manual Treatments       06/08/17 1439          Manual Rx 1    Manual Rx 1 Location B lower lumbar and R sacral ridge, piriformis, upper glute  -TC      Manual Rx 1 Type prone STM   -TC      Manual Rx 1 Duration 12  -TC        User Key  (r) = Recorded By, (t) = Taken By, (c) = Cosigned By    Initials Name Provider Type    TC Ese Valdovinos, PT DPT Physical Therapist                PT OP Goals       06/08/17 1439       PT Short Term Goals    STG Date to Achieve 06/22/17  -TC     STG 1 Pt will be able to demonstrate a 50% improvement in her thoracolumbar ROM in order to restore overall posturing.    -TC     STG 1 Progress Met  -TC     STG 2 Pt will illustrate improved posture avoiding forward flexed posture and anterior pelvic tilt to promote more functional movements during ADLs.    -TC     STG 2 Progress Ongoing  -TC     STG 2 Progress Comments still requires cues to correct ant pelvic tilt and erect posture   -TC     Long Term Goals    LTG Date to Achieve 07/06/17  -TC     LTG 1 Pt will be able to demonstrate full thoracolumbar ROM reporting <2-3/10 pain in order to restore overall posturing  and decrease strain placed on lumbar spine.     -TC     LTG 1 Progress Ongoing  -TC     LTG 1 Progress Comments still limited and painful today post cleaning  -TC     LTG 2 Pt will show full hip PROM painfree in order to improve mechanics of gait and decrease the amount of strain placed on her SIJ and lower lumbar spine.   -TC     LTG 2 Progress Met  -TC     LTG 3 Pt will illustrate gluteus medius strength to 4/5 so that she will be able to demonstrate decreased compensatory strategies during gait and decrease abnormal forces placed through lumbar spine.   -TC     LTG 3 Progress Partially Met  -TC     LTG 4 Pt will demonstrate normalized mechanics of gait avoiding lateral trunk flexion, bilateral knee extension, and increased thoracic rotation.   -TC     LTG 4 Progress Ongoing  -TC     LTG 5 Pt will be able to perform bilateral SLS for >15s in order to show improvements in hip and core control/stability.   -TC     LTG 5 Progress Met  -TC     Time Calculation    PT Goal Re-Cert Due Date 07/06/17  -TC       User Key  (r) = Recorded By, (t) = Taken By, (c) = Cosigned By    Initials Name Provider Type    NATHALIE Valdovinos, PT DPT Physical Therapist                Therapy Education       06/08/17 1506          Therapy Education    Given HEP;Symptoms/condition management;Posture/body mechanics  -TC      Program Reinforced  -TC      How Provided Verbal  -TC      Provided to Patient  -TC      Level of Understanding Teach back education performed;Verbalized  -TC        User Key  (r) = Recorded By, (t) = Taken By, (c) = Cosigned By    Initials Name Provider Type    NATHALIE Valdovinos, PT DPT Physical Therapist                Outcome Measures       06/08/17 1439          Modified Oswestry    Modified Oswestry Score/Comments 22/45  -TC      Functional Assessment    Outcome Measure Options Modifed Owestry  -TC        User Key  (r) = Recorded By, (t) = Taken By, (c) = Cosigned By    Initials Name Provider Type    NATHALIE Mulligan  Ginette Valdovinos, PT DPT Physical Therapist            Time Calculation:   Start Time: 1439  Stop Time: 1530  Time Calculation (min): 51 min  PT Non-Billable Time (min): 2 min  Total Timed Code Minutes- PT: 49 minute(s)    Therapy Charges for Today     Code Description Service Date Service Provider Modifiers Qty    72210173575 HC PT MANUAL THERAPY EA 15 MIN 6/8/2017 Ese Valdovinos, PT DPT GP 1    39213671217 HC PT THER PROC EA 15 MIN 6/8/2017 Ese Valdovinos, PT DPT GP 2    34654099207 HC PT MOBILITY CURRENT 6/8/2017 Ese Valdovinos, PT DPT GP, CK 1    80412418892 HC PT MOBILITY PROJECTED 6/8/2017 Ese Valdovinos, PT DPT GP, CI 1          PT G-Codes  PT Professional Judgement Used?: Yes  Outcome Measure Options: Len Singh  Score: 22  Functional Limitation: Mobility: Walking and moving around  Mobility: Walking and Moving Around Current Status (): At least 40 percent but less than 60 percent impaired, limited or restricted  Mobility: Walking and Moving Around Goal Status (): At least 1 percent but less than 20 percent impaired, limited or restricted         Ese Valdovinos, PT DPT  6/8/2017

## 2017-06-13 ENCOUNTER — HOSPITAL ENCOUNTER (OUTPATIENT)
Dept: PHYSICAL THERAPY | Facility: HOSPITAL | Age: 32
Setting detail: THERAPIES SERIES
Discharge: HOME | End: 2017-06-13

## 2017-06-13 DIAGNOSIS — M54.50 CHRONIC LEFT-SIDED LOW BACK PAIN WITHOUT SCIATICA: Primary | ICD-10-CM

## 2017-06-13 DIAGNOSIS — G89.29 CHRONIC LEFT-SIDED LOW BACK PAIN WITHOUT SCIATICA: Primary | ICD-10-CM

## 2017-06-13 DIAGNOSIS — M47.26 OSTEOARTHRITIS OF SPINE WITH RADICULOPATHY, LUMBAR REGION: ICD-10-CM

## 2017-06-13 PROCEDURE — 97110 THERAPEUTIC EXERCISES: CPT

## 2017-06-13 PROCEDURE — 97140 MANUAL THERAPY 1/> REGIONS: CPT

## 2017-06-13 NOTE — THERAPY TREATMENT NOTE
Outpatient Physical Therapy Ortho Treatment Note   [place] [facility]     Patient Name: Arlen Brice  : 1985  MRN: [id number]  Today's Date: 2017      Visit Date: 2017    Visit Dx:    ICD-10-CM ICD-9-CM   1. Chronic left-sided low back pain without sciatica M54.5 724.2    G89.29 338.29   2. Osteoarthritis of spine with radiculopathy, lumbar region M47.26 721.3       There is no problem list on file for this patient.       Past Medical History:   Diagnosis Date   • Asthma    • Disease of thyroid gland    • Hepatitis C         Past Surgical History:   Procedure Laterality Date   • APPENDECTOMY     •  SECTION      Total of 5   • CHOLECYSTECTOMY                               PT Assessment/Plan       17 6036       PT Assessment    Assessment Comments Patient's pain was increased today at beginning of treatment, which she contributes to the weather.  We progressed with more weightbearing activities today for posture, core, and hip strengthening.  Patient tolerated all activities until the very end, which she had increased intensity of pain.  She continues to have decreased recruitment of core and hip musculature and also has decreased bilateral hip extension.  She is no longer having radicular symptoms down her left leg, however her overall pain continues to be elevated and increased when trying to progress strengthening exercises.  -PAOLA     PT Plan    PT Plan Comments Continue to focus on postural education and hip/core strengthening in more standing and weightbearing positions.  -PAOLA       User Key  (r) = Recorded By, (t) = Taken By, (c) = Cosigned By    Initials Name Provider Type    PAOLA Samano PTA Physical Therapy Assistant                    Exercises       17 [id number]          Subjective Comments    Subjective Comments Patient reports with the weather changing and getting hot, she says it has caused increased pain.  She reports there has been no symptoms down into her left  leg. Now the pain is more on her right than left lower back.  -PAOLA      Subjective Pain    Able to rate subjective pain? yes  -PAOLA      Pre-Treatment Pain Level 8  -PAOLA      Post-Treatment Pain Level 9  -PAOLA      Exercise 1    Exercise Name 1 sidelying hip ext/ abd on 55cm physioball   -PAOLA      Cueing 1 Verbal;Tactile  -PAOLA      Sets 1 3  -PAOLA      Reps 1 10  -PAOLA      Exercise 2    Exercise Name 2 seated on 75cm marches  -PAOLA      Cueing 2 Verbal;Tactile  -PAOLA      Sets 2 2  -PAOLA      Reps 2 10  -PAOLA      Exercise 3    Exercise Name 3 standing against 1/2 foam thoracic stretch with W's  -PAOLA      Cueing 3 Verbal;Tactile;Demo  -PAOLA      Sets 3 2  -PAOLA      Reps 3 10  -PAOLA      Exercise 4    Exercise Name 4 standing against 1/2 foam PPT  -PAOLA      Cueing 4 Verbal;Tactile  -PAOLA      Sets 4 2  -PAOLA      Reps 4 10  -PAOLA      Exercise 5    Exercise Name 5 seated hip abduction  -PAOLA      Cueing 5 Tactile  -PAOLA      Equipment 5 Theraband  -PAOLA      Resistance 5 Red  -PAOLA      Sets 5 1  -PAOLA      Reps 5 10  -PAOLA      Time (Minutes) 5 Pt. had increased pain after 10 reps, transitioned to straightback chair, which relieves symptoms.  -PAOLA      Exercise 6    Exercise Name 6 seated isometric hip ext onto dynadisc, had intense increase of pain, therefore terminated.  Described pain as stabbing and throbbing  -PAOLA      Time (Minutes) 6 3  -PAOLA      Exercise 7    Exercise Name 7 Ended with sitting in straightback chair to try to relieve symptoms and then walked in hallway, but pain was remaining constant as a throbbing in lower right back.  She states its the same pain she has been having, but more intense.  -PAOLA        User Key  (r) = Recorded By, (t) = Taken By, (c) = Cosigned By    Initials Name Provider Type    PAOLA Samano, PTA Physical Therapy Assistant                        Manual Rx (last 36 hours)      Manual Treatments       06/13/17 1342          Manual Rx 1    Manual Rx 1 Location B lower lumbar, piriformis, upper glute  -PAOLA      Manual Rx  1 Type prone STM   -PAOLA      Manual Rx 1 Duration 10  -PAOLA        User Key  (r) = Recorded By, (t) = Taken By, (c) = Cosigned By    Initials Name Provider Type    PAOLA Samano PTA Physical Therapy Assistant                PT OP Goals       06/13/17 1339       PT Short Term Goals    STG Date to Achieve 06/22/17  -PAOLA     STG 1 Pt will be able to demonstrate a 50% improvement in her thoracolumbar ROM in order to restore overall posturing.    -PAOLA     STG 1 Progress Met  -PAOLA     STG 2 Pt will illustrate improved posture avoiding forward flexed posture and anterior pelvic tilt to promote more functional movements during ADLs.    -PAOAL     STG 2 Progress Ongoing  -PAOLA     STG 2 Progress Comments Continues to rest in forward slumped posture.  We worked on standing posterior pelvic tilt today and standing thoracic stretching to encourage better posture.  -PAOLA     STG 3 --  -PAOLA     Long Term Goals    LTG Date to Achieve 07/06/17  -PAOLA     LTG 1 Pt will be able to demonstrate full thoracolumbar ROM reporting <2-3/10 pain in order to restore overall posturing and decrease strain placed on lumbar spine.     -PAOLA     LTG 1 Progress Ongoing  -PAOLA     LTG 2 Pt will show full hip PROM painfree in order to improve mechanics of gait and decrease the amount of strain placed on her SIJ and lower lumbar spine.   -PAOLA     LTG 2 Progress Met  -PAOLA     LTG 3 Pt will illustrate gluteus medius strength to 4/5 so that she will be able to demonstrate decreased compensatory strategies during gait and decrease abnormal forces placed through lumbar spine.   -PAOLA     LTG 3 Progress Partially Met  -PAOLA     LTG 4 Pt will demonstrate normalized mechanics of gait avoiding lateral trunk flexion, bilateral knee extension, and increased thoracic rotation.   -PAOLA     LTG 4 Progress Ongoing  -PAOLA     LTG 5 Pt will be able to perform bilateral SLS for >15s in order to show improvements in hip and core control/stability.   -PAOLA     LTG 5 Progress Met  -PAOLA     Time  Calculation    PT Goal Re-Cert Due Date 07/06/17  -PAOLA       User Key  (r) = Recorded By, (t) = Taken By, (c) = Cosigned By    Initials Name Provider Type    PAOLA Samano PTA Physical Therapy Assistant                Therapy Education       06/13/17 1603          Therapy Education    Given Posture/body mechanics  -PAOLA      Program Reinforced  -PAOLA      How Provided Verbal  -PAOLA      Provided to Patient  -PAOLA      Level of Understanding Verbalized  -PAOLA        User Key  (r) = Recorded By, (t) = Taken By, (c) = Cosigned By    Initials Name Provider Type    PAOLA Samano PTA Physical Therapy Assistant                Time Calculation:   Start Time: 1339  Stop Time: 1430  Time Calculation (min): 51 min  PT Non-Billable Time (min): 6 min  Total Timed Code Minutes- PT: 45 minute(s)    Therapy Charges for Today     Code Description Service Date Service Provider Modifiers Qty    20994907384 HC PT MANUAL THERAPY EA 15 MIN 6/13/2017 Alexis Samano PTA GP 1    63628322869 HC PT THER PROC EA 15 MIN 6/13/2017 Alexis Samano PTA GP 2                    Alexis Samano PTA  6/13/2017

## 2017-06-15 ENCOUNTER — HOSPITAL ENCOUNTER (OUTPATIENT)
Dept: PHYSICAL THERAPY | Facility: HOSPITAL | Age: 32
Setting detail: THERAPIES SERIES
Discharge: HOME | End: 2017-06-15

## 2017-06-15 DIAGNOSIS — M54.50 CHRONIC LEFT-SIDED LOW BACK PAIN WITHOUT SCIATICA: Primary | ICD-10-CM

## 2017-06-15 DIAGNOSIS — G89.29 CHRONIC LEFT-SIDED LOW BACK PAIN WITHOUT SCIATICA: Primary | ICD-10-CM

## 2017-06-15 DIAGNOSIS — M47.26 OSTEOARTHRITIS OF SPINE WITH RADICULOPATHY, LUMBAR REGION: ICD-10-CM

## 2017-06-15 PROCEDURE — 97110 THERAPEUTIC EXERCISES: CPT

## 2017-06-15 NOTE — THERAPY TREATMENT NOTE
"    Outpatient Physical Therapy Ortho Treatment Note  [facility]     Patient Name: Arlen Brice  : 1985  MRN: [id number]  Today's Date: 6/15/2017      Visit Date: 06/15/2017    Visit Dx:    ICD-10-CM ICD-9-CM   1. Chronic left-sided low back pain without sciatica M54.5 724.2    G89.29 338.29   2. Osteoarthritis of spine with radiculopathy, lumbar region M47.26 721.3       There is no problem list on file for this patient.       Past Medical History:   Diagnosis Date   • Asthma    • Disease of thyroid gland    • Hepatitis C         Past Surgical History:   Procedure Laterality Date   • APPENDECTOMY     •  SECTION      Total of 5   • CHOLECYSTECTOMY                               PT Assessment/Plan       06/15/17 1159       PT Assessment    Assessment Comments Although she continues to have B hip and core weakness she has improved her strength and progressed towards her hip strength goal. Although I had inquired today if she thought she was ready for D/C she expressed the desire to attend all of her scheduled sessions.  -EC     PT Plan    PT Plan Comments Continue to work on her posture and hip and core strength.  -EC       User Key  (r) = Recorded By, (t) = Taken By, (c) = Cosigned By    Initials Name Provider Type    EC Catarino Choi PTA Physical Therapy Assistant                    Exercises       06/15/17 1100          Subjective Comments    Subjective Comments \"today is kind of rought with this weather\"  -EC      Subjective Pain    Able to rate subjective pain? yes  -EC      Pre-Treatment Pain Level 9  -EC      Exercise 1    Exercise Name 1 B hip ER stretches with intermittent inferior/lateral glides  -EC      Reps 1 3  -EC      Time (Seconds) 1 30  -EC      Exercise 2    Exercise Name 2 \"X\" pattern against bolster against wall  -EC      Equipment 2 Theraband  -EC      Resistance 2 Red  -EC      Reps 2 20  -EC      Exercise 3    Exercise Name 3 B unilateral shoulder horizontal abduction " against bolster  against   -EC      Resistance 3 Red  -EC      Exercise 4    Exercise Name 4 alt TKE on 85cm swiss ball   -EC      Reps 4 10  -EC      Exercise 5    Exercise Name 5 attempted standing wall ball hip abduction with 55 cm swiss ball but there was too much movement substitution  -EC      Exercise 6    Exercise Name 6 bridging with clamshell  -EC      Equipment 6 Theraband  -EC      Resistance 6 Green  -EC      Sets 6 2  -EC      Reps 6 10  -EC        User Key  (r) = Recorded By, (t) = Taken By, (c) = Cosigned By    Initials Name Provider Type    JUNIOR Choi, PTA Physical Therapy Assistant                               PT OP Goals       06/15/17 1118       PT Short Term Goals    STG Date to Achieve 06/22/17  -EC     STG 1 Pt will be able to demonstrate a 50% improvement in her thoracolumbar ROM in order to restore overall posturing.    -EC     STG 1 Progress Met  -EC     STG 2 Pt will illustrate improved posture avoiding forward flexed posture and anterior pelvic tilt to promote more functional movements during ADLs.    -EC     STG 2 Progress Ongoing  -EC     Long Term Goals    LTG Date to Achieve 07/06/17  -EC     LTG 1 Pt will be able to demonstrate full thoracolumbar ROM reporting <2-3/10 pain in order to restore overall posturing and decrease strain placed on lumbar spine.     -EC     LTG 1 Progress Ongoing  -EC     LTG 2 Pt will show full hip PROM painfree in order to improve mechanics of gait and decrease the amount of strain placed on her SIJ and lower lumbar spine.   -EC     LTG 2 Progress Met  -EC     LTG 3 Pt will illustrate gluteus medius strength to 4/5 so that she will be able to demonstrate decreased compensatory strategies during gait and decrease abnormal forces placed through lumbar spine.   -EC     LTG 3 Progress Partially Met  -EC     LTG 3 Progress Comments hip weakness led to difficulty with standing wall ball abduction  -EC     LTG 4 Pt will demonstrate normalized mechanics  of gait avoiding lateral trunk flexion, bilateral knee extension, and increased thoracic rotation.   -EC     LTG 4 Progress Ongoing  -EC     LTG 5 Pt will be able to perform bilateral SLS for >15s in order to show improvements in hip and core control/stability.   -EC     LTG 5 Progress Met  -EC     Time Calculation    PT Goal Re-Cert Due Date 07/06/17  -EC       User Key  (r) = Recorded By, (t) = Taken By, (c) = Cosigned By    Initials Name Provider Type    JUNIOR Choi PTA Physical Therapy Assistant                Therapy Education       06/15/17 1159          Therapy Education    Given Symptoms/condition management;Posture/body mechanics  -EC      How Provided Verbal  -EC      Provided to Patient  -EC      Level of Understanding Verbalized  -EC        User Key  (r) = Recorded By, (t) = Taken By, (c) = Cosigned By    Initials Name Provider Type    JUNIOR Choi PTA Physical Therapy Assistant                Time Calculation:   Start Time: 1118  Stop Time: 1158  Time Calculation (min): 40 min  Total Timed Code Minutes- PT: 40 minute(s)    Therapy Charges for Today     Code Description Service Date Service Provider Modifiers Qty    87924434974 HC PT THER PROC EA 15 MIN 6/15/2017 Catarino Choi PTA GP 3                    Catarino Choi PTA  6/15/2017

## 2017-06-20 ENCOUNTER — APPOINTMENT (OUTPATIENT)
Dept: PHYSICAL THERAPY | Facility: HOSPITAL | Age: 32
End: 2017-06-20

## 2017-06-21 ENCOUNTER — HOSPITAL ENCOUNTER (EMERGENCY)
Facility: HOSPITAL | Age: 32
Discharge: HOME | End: 2017-06-21
Admitting: NURSE PRACTITIONER

## 2017-06-21 VITALS
DIASTOLIC BLOOD PRESSURE: 90 MMHG | RESPIRATION RATE: 18 BRPM | SYSTOLIC BLOOD PRESSURE: 157 MMHG | HEART RATE: 73 BPM | HEIGHT: 68 IN | BODY MASS INDEX: 44.41 KG/M2 | TEMPERATURE: 98.7 F | OXYGEN SATURATION: 99 % | WEIGHT: 293 LBS

## 2017-06-21 DIAGNOSIS — G51.0 BELL'S PALSY: Primary | ICD-10-CM

## 2017-06-21 PROCEDURE — 99282 EMERGENCY DEPT VISIT SF MDM: CPT

## 2017-06-21 RX ORDER — METHYLPREDNISOLONE 4 MG/1
TABLET ORAL
Qty: 21 TABLET | Refills: 0 | OUTPATIENT
Start: 2017-06-21 | End: 2017-10-09

## 2017-06-21 RX ORDER — ACYCLOVIR 400 MG/1
800 TABLET ORAL
Qty: 7 TABLET | Refills: 0 | Status: SHIPPED | OUTPATIENT
Start: 2017-06-21 | End: 2022-09-22

## 2017-06-22 ENCOUNTER — APPOINTMENT (OUTPATIENT)
Dept: PHYSICAL THERAPY | Facility: HOSPITAL | Age: 32
End: 2017-06-22

## 2017-06-22 NOTE — ED PROVIDER NOTES
"Subjective   HPI Comments: Patient is a 31-year-old white female presents with a cold sore that popped up on her lip 3 days ago.  She states that that is starting to resolve.  She states that same day, she started having a tingling feeling in the left side of her face and that night she had difficulty with her facial expressions in the left side of her cheek and mouth drooping of her eyelid.  She states that she was concerned about it today and felt she needed to come in for further evaluation and treatment.    Patient is a 31 y.o. female presenting with neurologic complaint.   History provided by:  Patient   used: No    Neurologic Problem         Review of Systems   Constitutional: Negative.    HENT: Negative.    Eyes: Negative.    Respiratory: Negative.    Cardiovascular: Negative.    Gastrointestinal: Negative.    Endocrine: Negative.    Genitourinary: Negative.    Musculoskeletal: Negative.    Skin: Negative.    Allergic/Immunologic: Negative.    Neurological:        Pt presents with tingling to left side of face that started about 3 days ago. She state that she started with \"cold sore \" on lip that started 3 days ago and then about 30 min after that she started having some tingling to left side of face and having some weakness of facial muscles causing her to \"be unable to puff out my cheeks\" she states that the tingling sensation has resolved, but she is having twitching of facial muscles and diffic closing left eye. She states the symptoms had not resolved so she decided to come in and be seen   Hematological: Negative.    Psychiatric/Behavioral: Negative.    All other systems reviewed and are negative.      Past Medical History:   Diagnosis Date   • Asthma    • Disease of thyroid gland    • Hepatitis C        Allergies   Allergen Reactions   • Aspirin        Past Surgical History:   Procedure Laterality Date   • APPENDECTOMY     •  SECTION      Total of 5   • CHOLECYSTECTOMY   " "      History reviewed. No pertinent family history.    Social History     Social History   • Marital status: Single     Spouse name: N/A   • Number of children: N/A   • Years of education: N/A     Social History Main Topics   • Smoking status: Current Every Day Smoker     Packs/day: 0.50   • Smokeless tobacco: None   • Alcohol use No   • Drug use: Yes     Special: Marijuana      Comment: Last use was yesterday   • Sexual activity: Not Asked     Other Topics Concern   • None     Social History Narrative   • None       Prior to Admission medications    Medication Sig Start Date End Date Taking? Authorizing Provider   albuterol (PROVENTIL HFA;VENTOLIN HFA) 108 (90 BASE) MCG/ACT inhaler Inhale 2 puffs Every 4 (Four) Hours As Needed for wheezing.    Historical Provider, MD   furosemide (LASIX) 40 MG tablet Take 40 mg by mouth 2 (Two) Times a Day.    Historical Provider, MD   gabapentin (NEURONTIN) 100 MG capsule Take 100 mg by mouth 3 (Three) Times a Day.    Historical Provider, MD   HYDROcodone-acetaminophen (NORCO) 5-325 MG per tablet Take 1 tablet by mouth Every 6 (Six) Hours As Needed.    Historical Provider, MD   levothyroxine (SYNTHROID, LEVOTHROID) 175 MCG tablet Take 175 mcg by mouth Daily.    Historical Provider, MD   potassium chloride (K-DUR,KLOR-CON) 20 MEQ CR tablet Take 20 mEq by mouth 2 (Two) Times a Day.    Historical Provider, MD       /90 (Patient Position: Sitting)  Pulse 73  Temp 98.7 °F (37.1 °C) (Temporal Artery )   Resp 18  Ht 68\" (172.7 cm)  Wt (!) 358 lb (162 kg)  SpO2 99%  BMI 54.43 kg/m2    Objective   Physical Exam   Constitutional: She is oriented to person, place, and time. She appears well-developed and well-nourished.   HENT:   Head: Normocephalic and atraumatic.   Drooping of the eyelid with diffic closing left eye    Eyes: Conjunctivae and EOM are normal. Pupils are equal, round, and reactive to light.   Neck: Normal range of motion. Neck supple. No tracheal deviation " present. No thyromegaly present.   Cardiovascular: Normal rate, regular rhythm, normal heart sounds and intact distal pulses.    Pulmonary/Chest: Effort normal and breath sounds normal. No respiratory distress. She has no wheezes. She has no rales. She exhibits no tenderness.   Abdominal: Soft. Bowel sounds are normal.   Musculoskeletal: Normal range of motion.   Neurological: She is alert and oriented to person, place, and time. She has normal reflexes. No cranial nerve deficit.   Skin: Skin is warm and dry.   Psychiatric: She has a normal mood and affect. Her behavior is normal. Judgment and thought content normal.   Nursing note and vitals reviewed.      Procedures         Lab Results (last 24 hours)     ** No results found for the last 24 hours. **          No orders to display       ED Course  ED Course          MDM  Number of Diagnoses or Management Options  Bell's palsy: minor  Patient Progress  Patient progress: stable      Final diagnoses:   Bell's palsy          Kelli Fall, APRN  06/26/17 0752

## 2017-06-28 ENCOUNTER — TELEPHONE (OUTPATIENT)
Dept: PHYSICAL THERAPY | Facility: HOSPITAL | Age: 32
End: 2017-06-28

## 2017-06-28 NOTE — TELEPHONE ENCOUNTER
A message was left informing patient that due to her several same day cancellations and no shows that we will have to take her off the schedule at this time. We have not heard from her and therefore will cancel her remaining appointments.

## 2017-06-29 ENCOUNTER — APPOINTMENT (OUTPATIENT)
Dept: PHYSICAL THERAPY | Facility: HOSPITAL | Age: 32
End: 2017-06-29

## 2017-07-06 ENCOUNTER — APPOINTMENT (OUTPATIENT)
Dept: PHYSICAL THERAPY | Facility: HOSPITAL | Age: 32
End: 2017-07-06

## 2017-07-07 ENCOUNTER — APPOINTMENT (OUTPATIENT)
Dept: PHYSICAL THERAPY | Facility: HOSPITAL | Age: 32
End: 2017-07-07

## 2017-08-13 ENCOUNTER — HOSPITAL ENCOUNTER (EMERGENCY)
Facility: HOSPITAL | Age: 32
Discharge: HOME | End: 2017-08-13
Admitting: FAMILY MEDICINE

## 2017-08-13 VITALS
HEIGHT: 68 IN | HEART RATE: 80 BPM | BODY MASS INDEX: 44.41 KG/M2 | TEMPERATURE: 98.3 F | SYSTOLIC BLOOD PRESSURE: 149 MMHG | RESPIRATION RATE: 18 BRPM | WEIGHT: 293 LBS | OXYGEN SATURATION: 97 % | DIASTOLIC BLOOD PRESSURE: 83 MMHG

## 2017-08-13 DIAGNOSIS — J06.9 UPPER RESPIRATORY TRACT INFECTION, UNSPECIFIED TYPE: Primary | ICD-10-CM

## 2017-08-13 DIAGNOSIS — R68.84 JAW PAIN: ICD-10-CM

## 2017-08-13 PROCEDURE — 99282 EMERGENCY DEPT VISIT SF MDM: CPT

## 2017-08-13 RX ORDER — METHYLPREDNISOLONE 4 MG/1
TABLET ORAL
Qty: 21 TABLET | Refills: 0 | Status: SHIPPED | OUTPATIENT
Start: 2017-08-13 | End: 2022-09-22

## 2017-08-13 RX ORDER — CLINDAMYCIN HYDROCHLORIDE 300 MG/1
300 CAPSULE ORAL 3 TIMES DAILY
Qty: 21 CAPSULE | Refills: 0 | Status: SHIPPED | OUTPATIENT
Start: 2017-08-13 | End: 2017-08-20

## 2017-08-13 RX ORDER — CETIRIZINE HYDROCHLORIDE 10 MG/1
10 TABLET ORAL DAILY
Qty: 7 TABLET | Refills: 0 | Status: SHIPPED | OUTPATIENT
Start: 2017-08-13 | End: 2017-08-20

## 2017-08-13 NOTE — ED PROVIDER NOTES
Subjective   HPI Comments: Patient is a 31-year-old  female who presents ER today with complaint of nasal congestion, cough, runny nose, and right sided jaw pain.  Patient reports that since approximately week ago with a cough, chills, and nasal congestion.  Patient states she is having body aches at that time.  She tried using over-the-counter medication to treat this however her symptoms have continued.  Patient reports that 3 days ago she noticed a small nodule to her right lower jaw.  She states that it does swell intermittently.  She states that she fell ibuprofen for this and that it is been going away.  Patient states it is painful to touch.  She denies any recent dental problems.  Patient denies any fever.  She denies any difficulty swallowing.  She denies any difficulty swallowing, denies any throat or neck swelling. The pt presents to the ER today for further evaluation.     Patient is a 31 y.o. female presenting with URI and facial injury.   History provided by:  Patient   used: No    URI   Presenting symptoms: cough    Presenting symptoms: no congestion, no ear pain, no facial pain, no fatigue and no rhinorrhea    Severity:  Mild  Onset quality:  Sudden  Duration:  1 week  Timing:  Constant  Progression:  Unchanged  Chronicity:  New  Relieved by:  Nothing  Worsened by:  Nothing  Ineffective treatments:  None tried  Associated symptoms: myalgias    Associated symptoms: no arthralgias, no headaches, no neck pain, no sinus pain, no sneezing and no wheezing    Risk factors: not elderly, no chronic cardiac disease, no chronic kidney disease, no chronic respiratory disease, no diabetes mellitus, no immunosuppression, no recent illness and no recent travel    Facial Injury   Location:  R cheek (right jaw, no injury, small 1 x1 cm nodule noted under skin, no swelling noted, no erythema noted)  Time since incident:  3 days  Pain details:     Quality:  Aching and dull    Severity:   Mild    Duration:  3 days    Timing:  Constant    Progression:  Unchanged  Foreign body present:  No foreign bodies  Relieved by:  Nothing  Worsened by:  Nothing  Ineffective treatments:  None tried  Associated symptoms: no altered mental status, no congestion, no difficulty breathing, no double vision, no ear pain, no epistaxis, no headaches, no loss of consciousness, no malocclusion, no nausea, no neck pain, no rhinorrhea, no trismus, no vomiting and no wheezing    Risk factors: no alcohol use, no bone disorder, no concern for non-accidental trauma, no frequent falls and no prior injuries to these areas        Review of Systems   Constitutional: Negative for fatigue.   HENT: Negative for congestion, ear pain, nosebleeds, rhinorrhea and sneezing.    Eyes: Negative for double vision.   Respiratory: Positive for cough. Negative for wheezing.    Gastrointestinal: Negative for nausea and vomiting.   Musculoskeletal: Positive for myalgias. Negative for arthralgias and neck pain.   Neurological: Negative for loss of consciousness and headaches.   All other systems reviewed and are negative.      Past Medical History:   Diagnosis Date   • Asthma    • Disease of thyroid gland    • Hepatitis C        Allergies   Allergen Reactions   • Aspirin        Past Surgical History:   Procedure Laterality Date   • APPENDECTOMY     •  SECTION      Total of 5   • CHOLECYSTECTOMY         History reviewed. No pertinent family history.    Social History     Social History   • Marital status: Single     Spouse name: N/A   • Number of children: N/A   • Years of education: N/A     Social History Main Topics   • Smoking status: Current Every Day Smoker     Packs/day: 0.50   • Smokeless tobacco: None   • Alcohol use No   • Drug use: Yes     Special: Marijuana      Comment: Last use was yesterday   • Sexual activity: Not Asked     Other Topics Concern   • None     Social History Narrative           Objective   Physical Exam    Constitutional: She is oriented to person, place, and time. She appears well-developed and well-nourished.   HENT:   Head: Normocephalic and atraumatic.       Eyes: Conjunctivae are normal. Pupils are equal, round, and reactive to light.   Neck: Normal range of motion. Neck supple.   Cardiovascular: Normal rate, regular rhythm and normal heart sounds.    Pulmonary/Chest: Effort normal and breath sounds normal.   Musculoskeletal: Normal range of motion.   Neurological: She is alert and oriented to person, place, and time.   Skin: Skin is warm and dry.   Psychiatric: She has a normal mood and affect.   Nursing note and vitals reviewed.      Procedures         ED Course  ED Course   Comment By Time   Some altered patient reports for infection.  I did advise the patient that I would recommend ordering a CT scan and labs for the nodule that she has noted on the right lower jaw.  Patient STATES SHE DOES NOT TIME STATES THAT SHE DOES NOT REALLY WANT TO WATCH HER CHILDREN HAVE ENCOURAGED THE PATIENT PLEASE FOLLOW-UP YOUR PRIMARY CARE PROVIDER TOMORROW TO HAVE THIS REEVALUATED.  I WILL PLACE HER ON SOME ANTIBIOTICS.  I ASKED HER TO PLEASE RETURN TO THE ER IF ANY INCREASED OR WORSENING PROBLEMS.  AT THIS TIME SHE WILL BE DISCHARGED HOME IN STABLE CONDITION. Kathy Frye, APRN 08/13 1217                  MDM  Number of Diagnoses or Management Options  Jaw pain: new and requires workup  Upper respiratory tract infection, unspecified type: new and requires workup  Patient Progress  Patient progress: stable      Final diagnoses:   Upper respiratory tract infection, unspecified type   Jaw pain            Kathy Frye, APRN  08/13/17 1211

## 2017-08-16 ENCOUNTER — DOCUMENTATION (OUTPATIENT)
Dept: PHYSICAL THERAPY | Facility: HOSPITAL | Age: 32
End: 2017-08-16

## 2017-08-16 DIAGNOSIS — M54.50 CHRONIC LEFT-SIDED LOW BACK PAIN WITHOUT SCIATICA: Primary | ICD-10-CM

## 2017-08-16 DIAGNOSIS — G89.29 CHRONIC LEFT-SIDED LOW BACK PAIN WITHOUT SCIATICA: Primary | ICD-10-CM

## 2017-08-16 DIAGNOSIS — M47.26 OSTEOARTHRITIS OF SPINE WITH RADICULOPATHY, LUMBAR REGION: ICD-10-CM

## 2017-08-16 NOTE — THERAPY DISCHARGE NOTE
Outpatient Physical Therapy Discharge Summary         Patient Name: Arlen Brice  : 1985  MRN: [id number]    Today's Date: 2017    Visit Dx:    ICD-10-CM ICD-9-CM   1. Chronic left-sided low back pain without sciatica M54.5 724.2    G89.29 338.29   2. Osteoarthritis of spine with radiculopathy, lumbar region M47.26 721.3             PT OP Goals       17 1100       PT Short Term Goals    STG Date to Achieve 17  -EC     STG 1 Pt will be able to demonstrate a 50% improvement in her thoracolumbar ROM in order to restore overall posturing.    -EC     STG 1 Progress Met  -EC     STG 2 Pt will illustrate improved posture avoiding forward flexed posture and anterior pelvic tilt to promote more functional movements during ADLs.    -EC     STG 2 Progress Not Met  -EC     STG 3 still requires cues to correct ant pelvic tilt and erect posture   -EC     STG 3 Progress --   error  -EC     Long Term Goals    LTG Date to Achieve 17  -EC     LTG 1 Pt will be able to demonstrate full thoracolumbar ROM reporting <2-3/10 pain in order to restore overall posturing and decrease strain placed on lumbar spine.     -EC     LTG 1 Progress Not Met  -EC     LTG 2 Pt will show full hip PROM painfree in order to improve mechanics of gait and decrease the amount of strain placed on her SIJ and lower lumbar spine.   -EC     LTG 2 Progress Met  -EC     LTG 3 Pt will illustrate gluteus medius strength to 4/5 so that she will be able to demonstrate decreased compensatory strategies during gait and decrease abnormal forces placed through lumbar spine.   -EC     LTG 3 Progress Partially Met  -EC     LTG 4 Pt will demonstrate normalized mechanics of gait avoiding lateral trunk flexion, bilateral knee extension, and increased thoracic rotation.   -EC     LTG 4 Progress Not Met  -EC     LTG 5 Pt will be able to perform bilateral SLS for >15s in order to show improvements in hip and core control/stability.   -EC      LTG 5 Progress Met  -EC       User Key  (r) = Recorded By, (t) = Taken By, (c) = Cosigned By    Initials Name Provider Type    EC Catarino Choi PTA Physical Therapy Assistant          OP PT Discharge Summary  Date of Discharge: 08/16/17  Reason for Discharge: other (comment) (attendance policy)  Outcomes Achieved: Patient able to partially acheive established goals  Discharge Destination: Home with home program  Discharge Instructions: We have been working with this patient to increase her core and glute strength as well as work to improve her flexibility. She has met three goals, partially met another and has two remaining which she may achieve if she is complianat with her HEP      Time Calculation:                    Catarino Choi PTA  8/16/2017

## 2017-08-16 NOTE — ED NOTES
"ED Call Back Questions    1. How are you doing since leaving the Emergency Department?    MUCH BETTER  2. Do you have any questions about your discharge instructions? No     3. Have you filled your new prescriptions yet? Yes   a. Do you have any questions about those medications? No     4. Were you able to make a follow-up appointment with the physician? No     5. Do you have a primary care physician? Yes   a. If No, would you like for me to set you up with one? N/A  i. If Yes, “I will have our ED  give you a call right back at this number to work with you on the best time for an appointment.”    6. We are always looking to get better at what we do. Do you have any suggestions for what we can do to be even better? N/A  a. If Yes, \"Thank you for sharing your concerns. I apologize. I will follow up with our manager and patient . Would you like someone to call you back?\" N/A    7. Is there anything else I can do for you? No   VISIT WAS GOOD     Todd Lowe  08/16/17 3205    "

## 2017-08-25 ENCOUNTER — TRANSCRIBE ORDERS (OUTPATIENT)
Dept: ADMINISTRATIVE | Facility: HOSPITAL | Age: 32
End: 2017-08-25

## 2017-08-25 DIAGNOSIS — G47.33 OBSTRUCTIVE SLEEP APNEA: Primary | ICD-10-CM

## 2017-10-06 ENCOUNTER — HOSPITAL ENCOUNTER (OUTPATIENT)
Dept: SLEEP MEDICINE | Facility: HOSPITAL | Age: 32
Discharge: HOME | End: 2017-10-06

## 2018-07-18 ENCOUNTER — TRANSCRIBE ORDERS (OUTPATIENT)
Dept: ADMINISTRATIVE | Facility: HOSPITAL | Age: 33
End: 2018-07-18

## 2018-07-18 ENCOUNTER — LAB (OUTPATIENT)
Dept: LAB | Facility: HOSPITAL | Age: 33
End: 2018-07-18

## 2018-07-18 DIAGNOSIS — E03.9 HYPOTHYROIDISM, UNSPECIFIED TYPE: ICD-10-CM

## 2018-07-18 DIAGNOSIS — Z00.00 ENCOUNTER FOR GENERAL ADULT MEDICAL EXAMINATION WITHOUT ABNORMAL FINDINGS: Primary | ICD-10-CM

## 2018-07-18 DIAGNOSIS — Z20.2 CONTACT WITH AND (SUSPECTED) EXPOSURE TO INFECTIONS WITH A PREDOMINANTLY SEXUAL MODE OF TRANSMISSION: ICD-10-CM

## 2018-07-18 DIAGNOSIS — Z00.00 ENCOUNTER FOR GENERAL ADULT MEDICAL EXAMINATION WITHOUT ABNORMAL FINDINGS: ICD-10-CM

## 2018-07-18 LAB
ALBUMIN SERPL-MCNC: 3.7 G/DL (ref 3.5–5)
ALBUMIN/GLOB SERPL: 1 G/DL (ref 1.1–2.5)
ALP SERPL-CCNC: 57 U/L (ref 24–120)
ALT SERPL W P-5'-P-CCNC: 17 U/L (ref 0–54)
ANION GAP SERPL CALCULATED.3IONS-SCNC: 8 MMOL/L (ref 4–13)
AST SERPL-CCNC: 14 U/L (ref 7–45)
AUTO MIXED CELLS #: 0.7 10*3/MM3 (ref 0.1–2.6)
AUTO MIXED CELLS %: 9 % (ref 0.1–24)
BILIRUB SERPL-MCNC: 0.4 MG/DL (ref 0.1–1)
BILIRUB UR QL STRIP: NEGATIVE
BUN BLD-MCNC: 10 MG/DL (ref 5–21)
BUN/CREAT SERPL: 16.1
CALCIUM SPEC-SCNC: 9 MG/DL (ref 8.4–10.4)
CHLORIDE SERPL-SCNC: 107 MMOL/L (ref 98–110)
CHOLEST SERPL-MCNC: 145 MG/DL (ref 130–200)
CLARITY UR: CLEAR
CO2 SERPL-SCNC: 24 MMOL/L (ref 24–31)
COLOR UR: YELLOW
CREAT BLD-MCNC: 0.62 MG/DL (ref 0.5–1.4)
ERYTHROCYTE [DISTWIDTH] IN BLOOD BY AUTOMATED COUNT: 13 % (ref 12–15)
GFR SERPL CREATININE-BSD FRML MDRD: 111 ML/MIN/1.73
GLOBULIN UR ELPH-MCNC: 3.6 GM/DL
GLUCOSE BLD-MCNC: 106 MG/DL (ref 70–100)
GLUCOSE UR STRIP-MCNC: NEGATIVE MG/DL
HBA1C MFR BLD: 5.1 %
HCT VFR BLD AUTO: 34.3 % (ref 37–47)
HDLC SERPL-MCNC: 39 MG/DL
HGB BLD-MCNC: 11.8 G/DL (ref 12–16)
HGB UR QL STRIP.AUTO: NEGATIVE
KETONES UR QL STRIP: NEGATIVE
LDLC SERPL CALC-MCNC: 61 MG/DL (ref 0–99)
LDLC/HDLC SERPL: 1.57 {RATIO}
LEUKOCYTE ESTERASE UR QL STRIP.AUTO: NEGATIVE
LYMPHOCYTES # BLD AUTO: 2.2 10*3/MM3 (ref 0.8–7)
LYMPHOCYTES NFR BLD AUTO: 28.3 % (ref 15–45)
MCH RBC QN AUTO: 28.8 PG (ref 28–32)
MCHC RBC AUTO-ENTMCNC: 34.4 G/DL (ref 33–36)
MCV RBC AUTO: 83.7 FL (ref 82–98)
NEUTROPHILS # BLD AUTO: 5 10*3/MM3 (ref 1.5–8.3)
NEUTROPHILS NFR BLD AUTO: 62.7 % (ref 39–78)
NITRITE UR QL STRIP: NEGATIVE
PH UR STRIP.AUTO: 6.5 [PH] (ref 5–8)
PLATELET # BLD AUTO: 256 10*3/MM3 (ref 130–400)
PMV BLD AUTO: 9.6 FL (ref 6–12)
POTASSIUM BLD-SCNC: 4.1 MMOL/L (ref 3.5–5.3)
PROT SERPL-MCNC: 7.3 G/DL (ref 6.3–8.7)
PROT UR QL STRIP: NEGATIVE
RBC # BLD AUTO: 4.1 10*6/MM3 (ref 4.2–5.4)
SODIUM BLD-SCNC: 139 MMOL/L (ref 135–145)
SP GR UR STRIP: 1.02 (ref 1–1.03)
TRIGL SERPL-MCNC: 223 MG/DL (ref 0–149)
UROBILINOGEN UR QL STRIP: NORMAL
VLDLC SERPL-MCNC: 44.6 MG/DL
WBC NRBC COR # BLD: 7.9 10*3/MM3 (ref 4.8–10.8)

## 2018-07-18 PROCEDURE — 80053 COMPREHEN METABOLIC PANEL: CPT

## 2018-07-18 PROCEDURE — 83036 HEMOGLOBIN GLYCOSYLATED A1C: CPT

## 2018-07-18 PROCEDURE — 87389 HIV-1 AG W/HIV-1&-2 AB AG IA: CPT | Performed by: NURSE PRACTITIONER

## 2018-07-18 PROCEDURE — 84443 ASSAY THYROID STIM HORMONE: CPT | Performed by: NURSE PRACTITIONER

## 2018-07-18 PROCEDURE — 87591 N.GONORRHOEAE DNA AMP PROB: CPT | Performed by: NURSE PRACTITIONER

## 2018-07-18 PROCEDURE — 80061 LIPID PANEL: CPT

## 2018-07-18 PROCEDURE — 86696 HERPES SIMPLEX TYPE 2 TEST: CPT

## 2018-07-18 PROCEDURE — 81003 URINALYSIS AUTO W/O SCOPE: CPT

## 2018-07-18 PROCEDURE — 87491 CHLMYD TRACH DNA AMP PROBE: CPT | Performed by: NURSE PRACTITIONER

## 2018-07-18 PROCEDURE — 86696 HERPES SIMPLEX TYPE 2 TEST: CPT | Performed by: NURSE PRACTITIONER

## 2018-07-18 PROCEDURE — 36415 COLL VENOUS BLD VENIPUNCTURE: CPT

## 2018-07-18 PROCEDURE — 86695 HERPES SIMPLEX TYPE 1 TEST: CPT | Performed by: NURSE PRACTITIONER

## 2018-07-18 PROCEDURE — 86592 SYPHILIS TEST NON-TREP QUAL: CPT | Performed by: NURSE PRACTITIONER

## 2018-07-18 PROCEDURE — 80074 ACUTE HEPATITIS PANEL: CPT | Performed by: NURSE PRACTITIONER

## 2018-07-18 PROCEDURE — 85025 COMPLETE CBC W/AUTO DIFF WBC: CPT

## 2018-07-19 LAB
HAV IGM SERPL QL IA: NEGATIVE
HBV CORE IGM SERPL QL IA: NEGATIVE
HBV SURFACE AG SERPL QL IA: NEGATIVE
HCV AB SER DONR QL: REACTIVE
HCV S/C RATIO: 32.2 (ref 0–0.99)
TSH SERPL DL<=0.05 MIU/L-ACNC: 0.79 MIU/ML (ref 0.47–4.68)

## 2018-07-20 LAB
C TRACH RRNA SPEC DONR QL NAA+PROBE: NEGATIVE
HIV 1+2 AB+HIV1 P24 AG SERPL QL IA: NON REACTIVE
HSV-2 IGG SUPPLEMENTAL TEST: POSITIVE
HSV1 IGG SER IA-ACNC: 28.4 INDEX (ref 0–0.9)
HSV2 IGG SER IA-ACNC: 3.34 INDEX (ref 0–0.9)
N GONORRHOEA DNA SPEC QL NAA+PROBE: NEGATIVE
RPR SER QL: NON REACTIVE

## 2018-07-24 LAB
HSV1 IGM TITR SER IF: NORMAL TITER
HSV2 IGM TITR SER IF: NORMAL TITER

## 2019-04-09 ENCOUNTER — TRANSCRIBE ORDERS (OUTPATIENT)
Dept: ADMINISTRATIVE | Facility: HOSPITAL | Age: 34
End: 2019-04-09

## 2019-04-09 ENCOUNTER — HOSPITAL ENCOUNTER (OUTPATIENT)
Dept: GENERAL RADIOLOGY | Facility: HOSPITAL | Age: 34
Discharge: HOME | End: 2019-04-09
Admitting: NURSE PRACTITIONER

## 2019-04-09 DIAGNOSIS — R05.9 COUGH: Primary | ICD-10-CM

## 2019-04-09 PROCEDURE — 71046 X-RAY EXAM CHEST 2 VIEWS: CPT

## 2022-09-22 ENCOUNTER — LAB (OUTPATIENT)
Dept: LAB | Facility: HOSPITAL | Age: 37
End: 2022-09-22

## 2022-09-22 ENCOUNTER — PATIENT ROUNDING (BHMG ONLY) (OUTPATIENT)
Dept: INTERNAL MEDICINE | Facility: CLINIC | Age: 37
End: 2022-09-22

## 2022-09-22 ENCOUNTER — OFFICE VISIT (OUTPATIENT)
Dept: INTERNAL MEDICINE | Facility: CLINIC | Age: 37
End: 2022-09-22

## 2022-09-22 ENCOUNTER — HOSPITAL ENCOUNTER (OUTPATIENT)
Dept: GENERAL RADIOLOGY | Facility: HOSPITAL | Age: 37
Discharge: HOME | End: 2022-09-22

## 2022-09-22 VITALS
DIASTOLIC BLOOD PRESSURE: 80 MMHG | TEMPERATURE: 97.6 F | OXYGEN SATURATION: 98 % | RESPIRATION RATE: 15 BRPM | HEART RATE: 85 BPM | BODY MASS INDEX: 44.41 KG/M2 | SYSTOLIC BLOOD PRESSURE: 135 MMHG | HEIGHT: 68 IN | WEIGHT: 293 LBS

## 2022-09-22 DIAGNOSIS — F12.90 MARIJUANA USE: ICD-10-CM

## 2022-09-22 DIAGNOSIS — R20.2 PARESTHESIA: ICD-10-CM

## 2022-09-22 DIAGNOSIS — E03.9 HYPOTHYROIDISM, UNSPECIFIED TYPE: ICD-10-CM

## 2022-09-22 DIAGNOSIS — F41.9 ANXIETY AND DEPRESSION: ICD-10-CM

## 2022-09-22 DIAGNOSIS — Z72.0 TOBACCO USE: ICD-10-CM

## 2022-09-22 DIAGNOSIS — G89.29 CHRONIC LOW BACK PAIN WITH LEFT-SIDED SCIATICA, UNSPECIFIED BACK PAIN LATERALITY: ICD-10-CM

## 2022-09-22 DIAGNOSIS — J30.9 ALLERGIC RHINITIS, UNSPECIFIED SEASONALITY, UNSPECIFIED TRIGGER: ICD-10-CM

## 2022-09-22 DIAGNOSIS — F32.A ANXIETY AND DEPRESSION: ICD-10-CM

## 2022-09-22 DIAGNOSIS — F31.60 MIXED BIPOLAR AFFECTIVE DISORDER: ICD-10-CM

## 2022-09-22 DIAGNOSIS — E03.9 HYPOTHYROIDISM, UNSPECIFIED TYPE: Primary | ICD-10-CM

## 2022-09-22 DIAGNOSIS — M54.42 CHRONIC LOW BACK PAIN WITH LEFT-SIDED SCIATICA, UNSPECIFIED BACK PAIN LATERALITY: ICD-10-CM

## 2022-09-22 DIAGNOSIS — Z00.00 HEALTHCARE MAINTENANCE: ICD-10-CM

## 2022-09-22 DIAGNOSIS — R60.1 GENERALIZED EDEMA: ICD-10-CM

## 2022-09-22 LAB
25(OH)D3 SERPL-MCNC: 21.6 NG/ML (ref 30–100)
ALBUMIN SERPL-MCNC: 4.3 G/DL (ref 3.5–5.2)
ALBUMIN/GLOB SERPL: 1.3 G/DL
ALP SERPL-CCNC: 87 U/L (ref 39–117)
ALT SERPL W P-5'-P-CCNC: 8 U/L (ref 1–33)
ANION GAP SERPL CALCULATED.3IONS-SCNC: 8 MMOL/L (ref 5–15)
AST SERPL-CCNC: 12 U/L (ref 1–32)
BILIRUB SERPL-MCNC: 0.6 MG/DL (ref 0–1.2)
BUN SERPL-MCNC: 6 MG/DL (ref 6–20)
BUN/CREAT SERPL: 10.3 (ref 7–25)
CALCIUM SPEC-SCNC: 9.4 MG/DL (ref 8.6–10.5)
CHLORIDE SERPL-SCNC: 104 MMOL/L (ref 98–107)
CHOLEST SERPL-MCNC: 195 MG/DL (ref 0–200)
CO2 SERPL-SCNC: 26 MMOL/L (ref 22–29)
CREAT SERPL-MCNC: 0.58 MG/DL (ref 0.57–1)
DEPRECATED RDW RBC AUTO: 42.5 FL (ref 37–54)
EGFRCR SERPLBLD CKD-EPI 2021: 119.7 ML/MIN/1.73
ERYTHROCYTE [DISTWIDTH] IN BLOOD BY AUTOMATED COUNT: 13.4 % (ref 12.3–15.4)
GLOBULIN UR ELPH-MCNC: 3.2 GM/DL
GLUCOSE SERPL-MCNC: 95 MG/DL (ref 65–99)
HBA1C MFR BLD: 5.1 % (ref 4.8–5.6)
HCT VFR BLD AUTO: 40.9 % (ref 34–46.6)
HDLC SERPL-MCNC: 40 MG/DL (ref 40–60)
HGB BLD-MCNC: 13.3 G/DL (ref 12–15.9)
LDLC SERPL CALC-MCNC: 130 MG/DL (ref 0–100)
LDLC/HDLC SERPL: 3.17 {RATIO}
MCH RBC QN AUTO: 28.1 PG (ref 26.6–33)
MCHC RBC AUTO-ENTMCNC: 32.5 G/DL (ref 31.5–35.7)
MCV RBC AUTO: 86.5 FL (ref 79–97)
PLATELET # BLD AUTO: 289 10*3/MM3 (ref 140–450)
PMV BLD AUTO: 10.5 FL (ref 6–12)
POTASSIUM SERPL-SCNC: 4.4 MMOL/L (ref 3.5–5.2)
PROT SERPL-MCNC: 7.5 G/DL (ref 6–8.5)
RBC # BLD AUTO: 4.73 10*6/MM3 (ref 3.77–5.28)
SODIUM SERPL-SCNC: 138 MMOL/L (ref 136–145)
TRIGL SERPL-MCNC: 141 MG/DL (ref 0–150)
TSH SERPL DL<=0.05 MIU/L-ACNC: 11.86 UIU/ML (ref 0.27–4.2)
VIT B12 BLD-MCNC: 213 PG/ML (ref 211–946)
VLDLC SERPL-MCNC: 25 MG/DL (ref 5–40)
WBC NRBC COR # BLD: 7.58 10*3/MM3 (ref 3.4–10.8)

## 2022-09-22 PROCEDURE — 85027 COMPLETE CBC AUTOMATED: CPT

## 2022-09-22 PROCEDURE — 82306 VITAMIN D 25 HYDROXY: CPT

## 2022-09-22 PROCEDURE — 99204 OFFICE O/P NEW MOD 45 MIN: CPT | Performed by: INTERNAL MEDICINE

## 2022-09-22 PROCEDURE — 72100 X-RAY EXAM L-S SPINE 2/3 VWS: CPT

## 2022-09-22 PROCEDURE — 80053 COMPREHEN METABOLIC PANEL: CPT

## 2022-09-22 PROCEDURE — 84439 ASSAY OF FREE THYROXINE: CPT

## 2022-09-22 PROCEDURE — 82607 VITAMIN B-12: CPT

## 2022-09-22 PROCEDURE — 80061 LIPID PANEL: CPT

## 2022-09-22 PROCEDURE — 83036 HEMOGLOBIN GLYCOSYLATED A1C: CPT

## 2022-09-22 PROCEDURE — 84443 ASSAY THYROID STIM HORMONE: CPT

## 2022-09-22 PROCEDURE — 36415 COLL VENOUS BLD VENIPUNCTURE: CPT

## 2022-09-22 RX ORDER — IBUPROFEN 800 MG/1
800 TABLET ORAL AS NEEDED
COMMUNITY
Start: 2022-09-01

## 2022-09-22 RX ORDER — MONTELUKAST SODIUM 10 MG/1
10 TABLET ORAL AS NEEDED
COMMUNITY
Start: 2022-09-01 | End: 2023-02-09 | Stop reason: SDUPTHER

## 2022-09-22 RX ORDER — FLUOXETINE HYDROCHLORIDE 20 MG/1
20 CAPSULE ORAL DAILY
Qty: 30 CAPSULE | Refills: 2 | Status: SHIPPED | OUTPATIENT
Start: 2022-09-22 | End: 2023-02-09 | Stop reason: SDUPTHER

## 2022-09-22 RX ORDER — ERGOCALCIFEROL 1.25 MG/1
50000 CAPSULE ORAL WEEKLY
COMMUNITY
Start: 2022-09-01 | End: 2023-02-09 | Stop reason: SDUPTHER

## 2022-09-22 RX ORDER — FLUTICASONE PROPIONATE 50 MCG
2 SPRAY, SUSPENSION (ML) NASAL DAILY
COMMUNITY
Start: 2022-09-01

## 2022-09-22 RX ORDER — LEVOTHYROXINE SODIUM 88 UG/1
88 TABLET ORAL
COMMUNITY
Start: 2022-09-01 | End: 2022-09-26 | Stop reason: SDUPTHER

## 2022-09-22 NOTE — PROGRESS NOTES
Chief Complaint   Patient presents with   • Establish Care   • Numbness         History:  Arlen Brice is a 36 y.o. female who presents today for evaluation of the above problems.  She presents [facility] to [facility].    When she she has been having his allergies for the last 1 week related to construction within her apartment.  She is taking Flonase and Singulair with minimal benefit.  She has not tried over-the-counter antihistamine yet.    She has been going to [facility], but her nurse practitioner cannot prescribe all the medications for her.    She has been on gabapentin and Norco in the past for chronic low back pain.  Physical therapy in the past has not provided any benefit for her.  She can no longer see Dr. Waite due to missed appointments.  She has gone [facility], but they wanted to do therapy only and not prescribe medications according to Arlen.  She has a difficult time getting to appointments because she does not have a car    She also has pains from her elbow down into her hands.  At times her hands will go numb left is worse than right.  She has a hard time working because of these symptoms.  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  At times the whole hand will go numb, other times is just the ulnar distribution.  There is no weakness, however.    She has been diagnosed with anxiety, depression and mixed bipolar disorder.  She does not have a counselor or a psychiatrist.    She smokes marijuana as this helps her pain.  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  She has tried methamphetamine, cocaine, crack, and crank in the past.    She has had hepatitis C but was cured after [facility] treatment    HPI    Social History     Socioeconomic History   • Marital status: Single   Tobacco Use   • Smoking status: Current Every Day Smoker     Packs/day: 0.50   Substance and Sexual Activity   • Alcohol use: No   • Drug use: Yes     Types:  Marijuana   • Sexual activity: Yes     Partners: Male     Birth control/protection: Condom         ROS:  Review of Systems  As above       Current Outpatient Medications:   •  fluticasone (FLONASE) 50 MCG/ACT nasal spray, , Disp: , Rfl:   •  furosemide (LASIX) 40 MG tablet, Take 40 mg by mouth 2 (Two) Times a Day., Disp: , Rfl:   •  ibuprofen (ADVIL,MOTRIN) 800 MG tablet, Take 800 mg by mouth As Needed., Disp: , Rfl:   •  levothyroxine (SYNTHROID, LEVOTHROID) 88 MCG tablet, Take 88 mcg by mouth Every Morning Before Breakfast., Disp: , Rfl:   •  montelukast (SINGULAIR) 10 MG tablet, Take 10 mg by mouth As Needed., Disp: , Rfl:   •  vitamin D (ERGOCALCIFEROL) 1.25 MG (68178 UT) capsule capsule, Take 50,000 Units by mouth 1 (One) Time Per Week., Disp: , Rfl:   •  FLUoxetine (PROzac) 20 MG capsule, Take 1 capsule by mouth Daily., Disp: 30 capsule, Rfl: 2    Lab Results   Component Value Date    GLUCOSE 95 09/22/2022    BUN 6 09/22/2022    CREATININE 0.58 09/22/2022    EGFRIFNONA 111 07/18/2018    BCR 10.3 09/22/2022    K 4.4 09/22/2022    CO2 26.0 09/22/2022    CALCIUM 9.4 09/22/2022    ALBUMIN 4.30 09/22/2022    AST 12 09/22/2022    ALT 8 09/22/2022       WBC   Date Value Ref Range Status   09/22/2022 7.58 3.40 - 10.80 10*3/mm3 Final     RBC   Date Value Ref Range Status   09/22/2022 4.73 3.77 - 5.28 10*6/mm3 Final     Hemoglobin   Date Value Ref Range Status   09/22/2022 13.3 12.0 - 15.9 g/dL Final     Hematocrit   Date Value Ref Range Status   09/22/2022 40.9 34.0 - 46.6 % Final     MCV   Date Value Ref Range Status   09/22/2022 86.5 79.0 - 97.0 fL Final     MCH   Date Value Ref Range Status   09/22/2022 28.1 26.6 - 33.0 pg Final     MCHC   Date Value Ref Range Status   09/22/2022 32.5 31.5 - 35.7 g/dL Final     RDW   Date Value Ref Range Status   09/22/2022 13.4 12.3 - 15.4 % Final     RDW-SD   Date Value Ref Range Status   09/22/2022 42.5 37.0 - 54.0 fl Final     MPV   Date Value Ref Range Status   09/22/2022 10.5  "6.0 - 12.0 fL Final     Platelets   Date Value Ref Range Status   09/22/2022 289 140 - 450 10*3/mm3 Final     Neutrophil %   Date Value Ref Range Status   07/18/2018 62.7 39.0 - 78.0 % Final     Lymphocyte %   Date Value Ref Range Status   07/18/2018 28.3 15.0 - 45.0 % Final     Neutrophils, Absolute   Date Value Ref Range Status   07/18/2018 5.00 1.50 - 8.30 10*3/mm3 Final     Lymphocytes, Absolute   Date Value Ref Range Status   07/18/2018 2.20 0.80 - 7.00 10*3/mm3 Final         OBJECTIVE:  Visit Vitals  /80 (BP Location: Left arm, Patient Position: Sitting, Cuff Size: Adult)   Pulse 85   Temp 97.6 °F (36.4 °C) (Oral)   Resp 15   Ht 172.7 cm (67.99\")   Wt 133 kg (293 lb)   SpO2 98%   BMI 44.56 kg/m²      Physical Exam  Constitutional:       General: She is not in acute distress.     Appearance: She is well-developed. She is not diaphoretic.   HENT:      Head: Normocephalic and atraumatic.   Eyes:      Pupils: Pupils are equal, round, and reactive to light.   Neck:      Thyroid: No thyromegaly.      Trachea: Phonation normal.   Cardiovascular:      Rate and Rhythm: Normal rate and regular rhythm.      Heart sounds: No murmur heard.  Pulmonary:      Effort: Pulmonary effort is normal. No respiratory distress.      Breath sounds: Normal breath sounds. No wheezing, rhonchi or rales.   Abdominal:      General: Abdomen is flat.      Palpations: Abdomen is soft.      Tenderness: There is no abdominal tenderness.   Musculoskeletal:      Cervical back: Normal.      Thoracic back: Normal.      Lumbar back: Tenderness and bony tenderness present. No spasms. Decreased range of motion.        Back:    Skin:     Coloration: Skin is not pale.      Findings: No erythema.   Neurological:      Mental Status: She is alert.   Psychiatric:         Behavior: Behavior normal.         Thought Content: Thought content normal.         Judgment: Judgment normal.         Assessment/Plan    Diagnoses and all orders for this visit:    1. " Hypothyroidism, unspecified type (Primary)  -     TSH; Future    2. Healthcare maintenance  -     CBC (No Diff); Future  -     Comprehensive Metabolic Panel; Future  -     Hemoglobin A1c; Future  -     Lipid Panel; Future    3. Paresthesia  -     Vitamin B12; Future  -     TSH; Future  -     Vitamin D 25 Hydroxy; Future  -     EMG & Nerve Conduction Test; Future    4. Generalized edema    5. Anxiety and depression  -     FLUoxetine (PROzac) 20 MG capsule; Take 1 capsule by mouth Daily.  Dispense: 30 capsule; Refill: 2    6. Mixed bipolar affective disorder (HCC)  -     FLUoxetine (PROzac) 20 MG capsule; Take 1 capsule by mouth Daily.  Dispense: 30 capsule; Refill: 2    7. Allergic rhinitis, unspecified seasonality, unspecified trigger    8. Chronic low back pain with left-sided sciatica, unspecified back pain laterality  -     Cancel: Ambulatory Referral to Pain Management  -     XR Spine Lumbar 2 or 3 View; Future  -     Ambulatory Referral to Pain Management    9. Marijuana use    10. Tobacco use    11. Body mass index (BMI) 40.0-44.9, adult (HCC)   -     Vitamin D 25 Hydroxy; Future      First, I would like to get some labs.  Get some labs for preventative health maintenance including CBC, CMP, A1c and a lipid panel.  She has hypothyroidism so we will also check a TSH.  She has been having paresthesias and have ordered a B12 and vitamin D for evaluation of this.    I suspect she has ulnar nerve entrapment at the elbow.  There is pain on palpation and reproducible symptoms in her hands.  I would like to obtain EMG and nerve conduction study for evaluation.    Refill her Prozac for anxiety and depression.  Would recommend establishing with a counselor and a psychiatrist    Continue Flonase and Singulair for allergic rhinitis, but add over-the-counter oral antihistamine.    Going to refer to Dr. Garcia for evaluation.  She can no longer see Dr. Waite and did not like going to [facility].  I discussed her marijuana use and that she would need to come off of this before she would be considered for medicine at pain management.  She has been clean from harder drugs for the last 1 year or so.  Obtain lumbar x-ray today.  May refer to physical therapy.    Arlen NEWSOME Wipeng  reports that she has been smoking. She has been smoking about 0.50 packs per day. She does not have any smokeless tobacco history on file.. I have educated her on the risk of diseases from using tobacco products such as cancer, COPD and heart disease.     I advised her to quit and she is not willing to quit.    I spent 3  minutes counseling the patient.    Return in about 4 months (around 1/22/2023) for Medicare Wellness.      CHRISTAL Copeland MD  10:32 CDT  9/22/2022

## 2022-09-22 NOTE — PROGRESS NOTES
September 22, 2022    Hello, may I speak with Arlen Brice?    My name is Chayo Hood      I am  with [facility]  [address], [address]  [place] [id number] [phone].    Before we get started may I verify your date of birth? 1985    I am calling to officially welcome you to our practice and ask about your recent visit. Is this a good time to talk? yes    Tell me about your visit with us. What things went well? That I might be able to understand more of why I am in so much pain.       We're always looking for ways to make our patients' experiences even better. Do you have recommendations on ways we may improve?  no    Overall were you satisfied with your first visit to our practice? Yes but didn't know there was some regulations.       I appreciate you taking the time to speak with me today. Is there anything else I can do for you? no      Thank you, and have a great day.

## 2022-09-23 DIAGNOSIS — E03.9 HYPOTHYROIDISM, UNSPECIFIED TYPE: Primary | ICD-10-CM

## 2022-09-23 LAB — T4 FREE SERPL-MCNC: 0.82 NG/DL (ref 0.93–1.7)

## 2022-09-26 DIAGNOSIS — E03.9 HYPOTHYROIDISM, UNSPECIFIED TYPE: Primary | ICD-10-CM

## 2022-09-26 RX ORDER — LEVOTHYROXINE SODIUM 0.1 MG/1
100 TABLET ORAL
Qty: 30 TABLET | Refills: 5 | Status: SHIPPED | OUTPATIENT
Start: 2022-09-26 | End: 2023-02-10 | Stop reason: SDUPTHER

## 2022-10-19 ENCOUNTER — HOSPITAL ENCOUNTER (OUTPATIENT)
Dept: NEUROLOGY | Facility: HOSPITAL | Age: 37
Discharge: HOME | End: 2022-10-19
Admitting: INTERNAL MEDICINE

## 2022-10-19 DIAGNOSIS — R20.2 PARESTHESIA: ICD-10-CM

## 2022-10-19 PROCEDURE — 95911 NRV CNDJ TEST 9-10 STUDIES: CPT

## 2022-10-19 PROCEDURE — 95885 MUSC TST DONE W/NERV TST LIM: CPT

## 2022-10-19 PROCEDURE — 95885 MUSC TST DONE W/NERV TST LIM: CPT | Performed by: PSYCHIATRY & NEUROLOGY

## 2022-10-19 PROCEDURE — 95911 NRV CNDJ TEST 9-10 STUDIES: CPT | Performed by: PSYCHIATRY & NEUROLOGY

## 2023-02-09 ENCOUNTER — LAB (OUTPATIENT)
Dept: LAB | Facility: HOSPITAL | Age: 38
End: 2023-02-09
Payer: MEDICARE

## 2023-02-09 ENCOUNTER — OFFICE VISIT (OUTPATIENT)
Dept: INTERNAL MEDICINE | Facility: CLINIC | Age: 38
End: 2023-02-09
Payer: MEDICARE

## 2023-02-09 VITALS
SYSTOLIC BLOOD PRESSURE: 134 MMHG | HEIGHT: 68 IN | BODY MASS INDEX: 44.41 KG/M2 | HEART RATE: 98 BPM | DIASTOLIC BLOOD PRESSURE: 82 MMHG | WEIGHT: 293 LBS | OXYGEN SATURATION: 99 %

## 2023-02-09 DIAGNOSIS — Z00.00 MEDICARE ANNUAL WELLNESS VISIT, SUBSEQUENT: Primary | ICD-10-CM

## 2023-02-09 DIAGNOSIS — F32.A ANXIETY AND DEPRESSION: ICD-10-CM

## 2023-02-09 DIAGNOSIS — Z72.0 TOBACCO USE: ICD-10-CM

## 2023-02-09 DIAGNOSIS — E03.9 HYPOTHYROIDISM, UNSPECIFIED TYPE: ICD-10-CM

## 2023-02-09 DIAGNOSIS — Z91.81 AT LOW RISK FOR FALL: ICD-10-CM

## 2023-02-09 DIAGNOSIS — E55.9 VITAMIN D DEFICIENCY: ICD-10-CM

## 2023-02-09 DIAGNOSIS — Z00.00 MEDICARE ANNUAL WELLNESS VISIT, SUBSEQUENT: ICD-10-CM

## 2023-02-09 DIAGNOSIS — F12.90 MARIJUANA USE: ICD-10-CM

## 2023-02-09 DIAGNOSIS — Z13.31 DEPRESSION SCREEN: ICD-10-CM

## 2023-02-09 DIAGNOSIS — F31.60 MIXED BIPOLAR AFFECTIVE DISORDER: ICD-10-CM

## 2023-02-09 DIAGNOSIS — F41.9 ANXIETY AND DEPRESSION: ICD-10-CM

## 2023-02-09 LAB
25(OH)D3 SERPL-MCNC: 21 NG/ML (ref 30–100)
ALBUMIN SERPL-MCNC: 4.3 G/DL (ref 3.5–5.2)
ALBUMIN/GLOB SERPL: 1.2 G/DL
ALP SERPL-CCNC: 82 U/L (ref 39–117)
ALT SERPL W P-5'-P-CCNC: 8 U/L (ref 1–33)
ANION GAP SERPL CALCULATED.3IONS-SCNC: 6 MMOL/L (ref 5–15)
AST SERPL-CCNC: 15 U/L (ref 1–32)
BILIRUB SERPL-MCNC: 0.5 MG/DL (ref 0–1.2)
BUN SERPL-MCNC: 6 MG/DL (ref 6–20)
BUN/CREAT SERPL: 8.6 (ref 7–25)
CALCIUM SPEC-SCNC: 9.4 MG/DL (ref 8.6–10.5)
CHLORIDE SERPL-SCNC: 102 MMOL/L (ref 98–107)
CHOLEST SERPL-MCNC: 211 MG/DL (ref 0–200)
CO2 SERPL-SCNC: 29 MMOL/L (ref 22–29)
CREAT SERPL-MCNC: 0.7 MG/DL (ref 0.57–1)
DEPRECATED RDW RBC AUTO: 40.7 FL (ref 37–54)
EGFRCR SERPLBLD CKD-EPI 2021: 113.7 ML/MIN/1.73
ERYTHROCYTE [DISTWIDTH] IN BLOOD BY AUTOMATED COUNT: 13 % (ref 12.3–15.4)
GLOBULIN UR ELPH-MCNC: 3.5 GM/DL
GLUCOSE SERPL-MCNC: 91 MG/DL (ref 65–99)
HBA1C MFR BLD: 5.3 % (ref 4.8–5.6)
HCT VFR BLD AUTO: 39.8 % (ref 34–46.6)
HDLC SERPL-MCNC: 44 MG/DL (ref 40–60)
HGB BLD-MCNC: 13.3 G/DL (ref 12–15.9)
LDLC SERPL CALC-MCNC: 143 MG/DL (ref 0–100)
LDLC/HDLC SERPL: 3.18 {RATIO}
MCH RBC QN AUTO: 29.2 PG (ref 26.6–33)
MCHC RBC AUTO-ENTMCNC: 33.4 G/DL (ref 31.5–35.7)
MCV RBC AUTO: 87.5 FL (ref 79–97)
PLATELET # BLD AUTO: 331 10*3/MM3 (ref 140–450)
PMV BLD AUTO: 10.2 FL (ref 6–12)
POTASSIUM SERPL-SCNC: 4.7 MMOL/L (ref 3.5–5.2)
PROT SERPL-MCNC: 7.8 G/DL (ref 6–8.5)
RBC # BLD AUTO: 4.55 10*6/MM3 (ref 3.77–5.28)
SODIUM SERPL-SCNC: 137 MMOL/L (ref 136–145)
T4 FREE SERPL-MCNC: 0.9 NG/DL (ref 0.93–1.7)
TRIGL SERPL-MCNC: 135 MG/DL (ref 0–150)
TSH SERPL DL<=0.05 MIU/L-ACNC: 13.4 UIU/ML (ref 0.27–4.2)
VLDLC SERPL-MCNC: 24 MG/DL (ref 5–40)
WBC NRBC COR # BLD: 6.95 10*3/MM3 (ref 3.4–10.8)

## 2023-02-09 PROCEDURE — 1159F MED LIST DOCD IN RCRD: CPT | Performed by: INTERNAL MEDICINE

## 2023-02-09 PROCEDURE — 90677 PCV20 VACCINE IM: CPT | Performed by: INTERNAL MEDICINE

## 2023-02-09 PROCEDURE — G0008 ADMIN INFLUENZA VIRUS VAC: HCPCS | Performed by: INTERNAL MEDICINE

## 2023-02-09 PROCEDURE — 80061 LIPID PANEL: CPT

## 2023-02-09 PROCEDURE — 90686 IIV4 VACC NO PRSV 0.5 ML IM: CPT | Performed by: INTERNAL MEDICINE

## 2023-02-09 PROCEDURE — 80053 COMPREHEN METABOLIC PANEL: CPT

## 2023-02-09 PROCEDURE — G0439 PPPS, SUBSEQ VISIT: HCPCS | Performed by: INTERNAL MEDICINE

## 2023-02-09 PROCEDURE — G0009 ADMIN PNEUMOCOCCAL VACCINE: HCPCS | Performed by: INTERNAL MEDICINE

## 2023-02-09 PROCEDURE — 84443 ASSAY THYROID STIM HORMONE: CPT

## 2023-02-09 PROCEDURE — 36415 COLL VENOUS BLD VENIPUNCTURE: CPT

## 2023-02-09 PROCEDURE — 83036 HEMOGLOBIN GLYCOSYLATED A1C: CPT

## 2023-02-09 PROCEDURE — 1170F FXNL STATUS ASSESSED: CPT | Performed by: INTERNAL MEDICINE

## 2023-02-09 PROCEDURE — 85027 COMPLETE CBC AUTOMATED: CPT

## 2023-02-09 PROCEDURE — 84439 ASSAY OF FREE THYROXINE: CPT

## 2023-02-09 PROCEDURE — 82306 VITAMIN D 25 HYDROXY: CPT

## 2023-02-09 RX ORDER — ERGOCALCIFEROL 1.25 MG/1
50000 CAPSULE ORAL WEEKLY
Qty: 5 CAPSULE | Refills: 5 | Status: SHIPPED | OUTPATIENT
Start: 2023-02-09

## 2023-02-09 RX ORDER — MONTELUKAST SODIUM 10 MG/1
10 TABLET ORAL NIGHTLY
Qty: 30 TABLET | Refills: 5 | Status: SHIPPED | OUTPATIENT
Start: 2023-02-09

## 2023-02-09 RX ORDER — FLUOXETINE HYDROCHLORIDE 20 MG/1
20 CAPSULE ORAL DAILY
Qty: 30 CAPSULE | Refills: 5 | Status: SHIPPED | OUTPATIENT
Start: 2023-02-09

## 2023-02-09 NOTE — PROGRESS NOTES
The ABCs of the Annual Wellness Visit  Initial Medicare Wellness Visit    Subjective     Arlen Brice is a 37 y.o. female who presents for an Initial Medicare Wellness Visit.    She does not feel well over the last week or so.  She coughs some but not too bad.  Most the symptoms are feeling of fatigue, and a haziness over her.  She has had brain fog but also feels like the fog is going throughout her entire body.    Denies any fevers chills or other symptoms.    She did smoke marijuana earlier this morning    The following portions of the patient's history were reviewed and   updated as appropriate: allergies, current medications, past family history, past medical history, past social history, past surgical history and problem list.     Compared to one year ago, the patient feels her physical   health is worse.    Compared to one year ago, the patient feels her mental   health is the same.    Recent Hospitalizations:  She was not admitted to the hospital during the last year.       Current Medical Providers:  Patient Care Team:  ANGEL Copeland MD as PCP - General (Internal Medicine)    Outpatient Medications Prior to Visit   Medication Sig Dispense Refill   • albuterol sulfate  (90 Base) MCG/ACT inhaler Inhale 2 puffs Every 6 (Six) Hours As Needed for Wheezing or Shortness of Air. 8.5 g 0   • brompheniramine-pseudoephedrine-DM 30-2-10 MG/5ML syrup Take 10 mL by mouth 4 (Four) Times a Day As Needed for Congestion or Cough. 240 mL 0   • fluticasone (FLONASE) 50 MCG/ACT nasal spray 2 sprays into the nostril(s) as directed by provider Daily.     • furosemide (LASIX) 40 MG tablet Take 40 mg by mouth 2 (Two) Times a Day.     • ibuprofen (ADVIL,MOTRIN) 800 MG tablet Take 800 mg by mouth As Needed.     • levothyroxine (SYNTHROID, LEVOTHROID) 100 MCG tablet Take 1 tablet by mouth Every Morning Before Breakfast. 30 tablet 5   • FLUoxetine (PROzac) 20 MG capsule Take 1 capsule by mouth Daily. 30 capsule 2   •  "montelukast (SINGULAIR) 10 MG tablet Take 10 mg by mouth As Needed.     • vitamin D (ERGOCALCIFEROL) 1.25 MG (22213 UT) capsule capsule Take 50,000 Units by mouth 1 (One) Time Per Week.       No facility-administered medications prior to visit.       No opioid medication identified on active medication list. I have reviewed chart for other potential  high risk medication/s and harmful drug interactions in the elderly.          Aspirin is not on active medication list.  Aspirin use is not indicated based on review of current medical condition/s. Risk of harm outweighs potential benefits.  .    Patient Active Problem List   Diagnosis   • Chronic low back pain with left-sided sciatica     Advance Care Planning  Advance Directive is not on file.  ACP discussion was held with the patient during this visit. Patient does not have an advance directive, declines further assistance.       Objective    Vitals:    02/09/23 1038   BP: 134/82   Pulse: 98   SpO2: 99%   Weight: (!) 142 kg (313 lb)   Height: 172.7 cm (68\")     Physical exam was unremarkable.    Estimated body mass index is 47.59 kg/m² as calculated from the following:    Height as of this encounter: 172.7 cm (68\").    Weight as of this encounter: 142 kg (313 lb).    Class 3 Severe Obesity (BMI >=40). Obesity-related health conditions include the following: none. Obesity is worsening. BMI is is above average; BMI management plan is completed. We discussed portion control and increasing exercise.      Does the patient have evidence of cognitive impairment?   No    Lab Results   Component Value Date    TRIG 135 02/09/2023    HDL 44 02/09/2023     (H) 02/09/2023    VLDL 24 02/09/2023    HGBA1C 5.30 02/09/2023        HEALTH RISK ASSESSMENT    Smoking Status:  Social History     Tobacco Use   Smoking Status Every Day   • Packs/day: 0.50   • Types: Cigarettes   Smokeless Tobacco Not on file     Alcohol Consumption:  Social History     Substance and Sexual Activity "   Alcohol Use Yes    Comment: rare     Fall Risk Screen:    ANNE MARIEADI Fall Risk Assessment was completed, and patient is at LOW risk for falls.Assessment completed on:2/9/2023    Depression Screen:   PHQ-2/PHQ-9 Depression Screening 2/9/2023   Little Interest or Pleasure in Doing Things 0-->not at all   Feeling Down, Depressed or Hopeless 0-->not at all   PHQ-9: Brief Depression Severity Measure Score 0       Health Habits and Functional and Cognitive Screening:  Functional & Cognitive Status 2/9/2023   Do you have difficulty preparing food and eating? No   Do you have difficulty bathing yourself, getting dressed or grooming yourself? No   Do you have difficulty using the toilet? No   Do you have difficulty moving around from place to place? No   Do you have trouble with steps or getting out of a bed or a chair? No   Current Diet Limited Junk Food   Dental Exam Not up to date   Eye Exam Not up to date   Exercise (times per week) 5 times per week   Current Exercises Include Walking   Do you need help using the phone?  No   Are you deaf or do you have serious difficulty hearing?  No   Do you need help with transportation? Yes   Do you need help shopping? No   Do you need help preparing meals?  No   Do you need help with housework?  No   Do you need help with laundry? No   Do you need help taking your medications? No   Do you need help managing money? No   Do you ever drive or ride in a car without wearing a seat belt? No   Have you felt unusual stress, anger or loneliness in the last month? Yes   Who do you live with? Child   If you need help, do you have trouble finding someone available to you? No   Have you been bothered in the last four weeks by sexual problems? No   Do you have difficulty concentrating, remembering or making decisions? Yes       Age-appropriate Screening Schedule:  Refer to the list below for future screening recommendations based on patient's age, sex and/or medical conditions. Orders for these  recommended tests are listed in the plan section. The patient has been provided with a written plan.    Health Maintenance   Topic Date Due   • TDAP/TD VACCINES (1 - Tdap) Never done   • PAP SMEAR  Never done   • INFLUENZA VACCINE  Completed          CMS Preventative Services Quick Reference  Risk Factors Identified During Encounter    Immunizations Discussed/Encouraged: Prevnar 20 (Pneumococcal 20-valent conjugate)    The above risks/problems have been discussed with the patient.  Pertinent information has been shared with the patient in the After Visit Summary.  An After Visit Summary and PPPS were made available to the patient.  Diagnoses and all orders for this visit:    1. Medicare annual wellness visit, subsequent (Primary)  -     CBC (No Diff); Future  -     Comprehensive Metabolic Panel; Future  -     Hemoglobin A1c; Future  -     Lipid Panel; Future    2. Depression screen    3. At low risk for fall    4. Tobacco use    5. Marijuana use    6. Hypothyroidism, unspecified type  -     TSH; Future  -     T4, Free; Future    7. Vitamin D deficiency  -     Vitamin D,25-Hydroxy; Future    8. Anxiety and depression  -     FLUoxetine (PROzac) 20 MG capsule; Take 1 capsule by mouth Daily.  Dispense: 30 capsule; Refill: 5    9. Mixed bipolar affective disorder (HCC)  -     FLUoxetine (PROzac) 20 MG capsule; Take 1 capsule by mouth Daily.  Dispense: 30 capsule; Refill: 5    Other orders  -     Pneumococcal Conjugate Vaccine 20-Valent (PCV20)  -     FluLaval/Fluzone >6 mos (9656-1513)  -     montelukast (SINGULAIR) 10 MG tablet; Take 1 tablet by mouth Every Night.  Dispense: 30 tablet; Refill: 5  -     vitamin D (ERGOCALCIFEROL) 1.25 MG (12936 UT) capsule capsule; Take 1 capsule by mouth 1 (One) Time Per Week.  Dispense: 5 capsule; Refill: 5      Will get some labs today.  Recommend marijuana and tobacco cessation.  Refill medications as above.  She did receive Prevnar 20 today and tolerated this well without any  immediate side effects or issues.    Unclear etiology of her symptoms over the last week, but could be related to marijuana usage, anemia, metabolic derangement or worsening hypothyroidism.  Further work-up or management based on progression of her symptoms and the above work-up    Follow Up:  6 months

## 2023-02-10 DIAGNOSIS — E03.9 HYPOTHYROIDISM, UNSPECIFIED TYPE: ICD-10-CM

## 2023-02-10 RX ORDER — LEVOTHYROXINE SODIUM 0.12 MG/1
125 TABLET ORAL
Qty: 30 TABLET | Refills: 5 | Status: SHIPPED | OUTPATIENT
Start: 2023-02-10

## 2024-03-28 ENCOUNTER — HOSPITAL ENCOUNTER (OUTPATIENT)
Dept: GENERAL RADIOLOGY | Facility: HOSPITAL | Age: 39
Discharge: HOME | End: 2024-03-28
Payer: MEDICARE

## 2024-03-28 ENCOUNTER — OFFICE VISIT (OUTPATIENT)
Dept: FAMILY MEDICINE CLINIC | Facility: CLINIC | Age: 39
End: 2024-03-28
Payer: MEDICARE

## 2024-03-28 ENCOUNTER — LAB (OUTPATIENT)
Dept: LAB | Facility: HOSPITAL | Age: 39
End: 2024-03-28
Payer: MEDICARE

## 2024-03-28 VITALS
SYSTOLIC BLOOD PRESSURE: 128 MMHG | BODY MASS INDEX: 44.41 KG/M2 | WEIGHT: 293 LBS | DIASTOLIC BLOOD PRESSURE: 73 MMHG | OXYGEN SATURATION: 97 % | HEART RATE: 81 BPM | HEIGHT: 68 IN | TEMPERATURE: 97.6 F

## 2024-03-28 DIAGNOSIS — M54.42 CHRONIC LOW BACK PAIN WITH LEFT-SIDED SCIATICA, UNSPECIFIED BACK PAIN LATERALITY: ICD-10-CM

## 2024-03-28 DIAGNOSIS — R79.9 ABNORMAL FINDING OF BLOOD CHEMISTRY, UNSPECIFIED: ICD-10-CM

## 2024-03-28 DIAGNOSIS — F41.9 ANXIETY AND DEPRESSION: Primary | ICD-10-CM

## 2024-03-28 DIAGNOSIS — J30.2 SEASONAL ALLERGIES: ICD-10-CM

## 2024-03-28 DIAGNOSIS — G89.29 CHRONIC PAIN OF LEFT KNEE: ICD-10-CM

## 2024-03-28 DIAGNOSIS — E03.9 HYPOTHYROIDISM, UNSPECIFIED TYPE: ICD-10-CM

## 2024-03-28 DIAGNOSIS — F32.A ANXIETY AND DEPRESSION: Primary | ICD-10-CM

## 2024-03-28 DIAGNOSIS — E66.01 MORBID (SEVERE) OBESITY DUE TO EXCESS CALORIES: ICD-10-CM

## 2024-03-28 DIAGNOSIS — J06.9 UPPER RESPIRATORY INFECTION WITH COUGH AND CONGESTION: ICD-10-CM

## 2024-03-28 DIAGNOSIS — E55.9 VITAMIN D DEFICIENCY: ICD-10-CM

## 2024-03-28 DIAGNOSIS — M25.562 CHRONIC PAIN OF LEFT KNEE: ICD-10-CM

## 2024-03-28 DIAGNOSIS — R06.2 WHEEZING: ICD-10-CM

## 2024-03-28 DIAGNOSIS — G89.29 CHRONIC LOW BACK PAIN WITH LEFT-SIDED SCIATICA, UNSPECIFIED BACK PAIN LATERALITY: ICD-10-CM

## 2024-03-28 LAB
25(OH)D3 SERPL-MCNC: 13.3 NG/ML (ref 30–100)
ALBUMIN SERPL-MCNC: 4 G/DL (ref 3.5–5.2)
ALBUMIN/GLOB SERPL: 1.1 G/DL
ALP SERPL-CCNC: 76 U/L (ref 39–117)
ALT SERPL W P-5'-P-CCNC: 11 U/L (ref 1–33)
ANION GAP SERPL CALCULATED.3IONS-SCNC: 8 MMOL/L (ref 5–15)
AST SERPL-CCNC: 13 U/L (ref 1–32)
BILIRUB SERPL-MCNC: 0.5 MG/DL (ref 0–1.2)
BUN SERPL-MCNC: 5 MG/DL (ref 6–20)
BUN/CREAT SERPL: 8.6 (ref 7–25)
CALCIUM SPEC-SCNC: 9.2 MG/DL (ref 8.6–10.5)
CHLORIDE SERPL-SCNC: 106 MMOL/L (ref 98–107)
CHOLEST SERPL-MCNC: 158 MG/DL (ref 0–200)
CO2 SERPL-SCNC: 24 MMOL/L (ref 22–29)
CREAT SERPL-MCNC: 0.58 MG/DL (ref 0.57–1)
EGFRCR SERPLBLD CKD-EPI 2021: 118.2 ML/MIN/1.73
GLOBULIN UR ELPH-MCNC: 3.7 GM/DL
GLUCOSE SERPL-MCNC: 94 MG/DL (ref 65–99)
HBA1C MFR BLD: 5.3 % (ref 4.8–5.6)
HDLC SERPL-MCNC: 32 MG/DL (ref 40–60)
LDLC SERPL CALC-MCNC: 93 MG/DL (ref 0–100)
LDLC/HDLC SERPL: 2.75 {RATIO}
POTASSIUM SERPL-SCNC: 3.9 MMOL/L (ref 3.5–5.2)
PROT SERPL-MCNC: 7.7 G/DL (ref 6–8.5)
SODIUM SERPL-SCNC: 138 MMOL/L (ref 136–145)
T4 FREE SERPL-MCNC: 0.78 NG/DL (ref 0.93–1.7)
TRIGL SERPL-MCNC: 190 MG/DL (ref 0–150)
TSH SERPL DL<=0.05 MIU/L-ACNC: 17.25 UIU/ML (ref 0.27–4.2)
VLDLC SERPL-MCNC: 33 MG/DL (ref 5–40)

## 2024-03-28 PROCEDURE — 82306 VITAMIN D 25 HYDROXY: CPT

## 2024-03-28 PROCEDURE — 36415 COLL VENOUS BLD VENIPUNCTURE: CPT

## 2024-03-28 PROCEDURE — 84439 ASSAY OF FREE THYROXINE: CPT

## 2024-03-28 PROCEDURE — 73562 X-RAY EXAM OF KNEE 3: CPT

## 2024-03-28 PROCEDURE — 83036 HEMOGLOBIN GLYCOSYLATED A1C: CPT

## 2024-03-28 PROCEDURE — 80061 LIPID PANEL: CPT

## 2024-03-28 PROCEDURE — 80053 COMPREHEN METABOLIC PANEL: CPT

## 2024-03-28 PROCEDURE — 84443 ASSAY THYROID STIM HORMONE: CPT

## 2024-03-28 RX ORDER — GABAPENTIN 300 MG/1
300 CAPSULE ORAL 3 TIMES DAILY
Qty: 90 CAPSULE | Refills: 1 | Status: SHIPPED | OUTPATIENT
Start: 2024-03-28

## 2024-03-28 RX ORDER — ALBUTEROL SULFATE 90 UG/1
2 AEROSOL, METERED RESPIRATORY (INHALATION) EVERY 4 HOURS PRN
Qty: 18 G | Refills: 11 | Status: SHIPPED | OUTPATIENT
Start: 2024-03-28

## 2024-03-28 RX ORDER — MONTELUKAST SODIUM 10 MG/1
10 TABLET ORAL NIGHTLY
Qty: 30 TABLET | Refills: 5 | Status: SHIPPED | OUTPATIENT
Start: 2024-03-28

## 2024-03-28 RX ORDER — ERGOCALCIFEROL 1.25 MG/1
50000 CAPSULE ORAL WEEKLY
Qty: 12 CAPSULE | Refills: 3 | Status: SHIPPED | OUTPATIENT
Start: 2024-03-28

## 2024-03-28 RX ORDER — LEVOTHYROXINE SODIUM 0.12 MG/1
125 TABLET ORAL
Qty: 90 TABLET | Refills: 0 | Status: SHIPPED | OUTPATIENT
Start: 2024-03-28

## 2024-03-28 RX ORDER — VENLAFAXINE HYDROCHLORIDE 37.5 MG/1
37.5 CAPSULE, EXTENDED RELEASE ORAL DAILY
Qty: 90 CAPSULE | Refills: 0 | Status: SHIPPED | OUTPATIENT
Start: 2024-03-28

## 2024-03-28 NOTE — PROGRESS NOTES
"Chief Complaint  Establish Care (Pt states wanting to est care and wanting to get back on medication, has not been on any medication over 2 years ) and Knee Pain (Left knee pain)    Subjective        Arlen Brice presents to [facility]  History of Present Illness  Tried prozac without improvement of mood, still angry with residual depression that she felt like was not improved when she was previously on thyroid supplement.  She reports that she has not had any medication in about 2 years, previously on gabapentin for chronic back pain.  She smokes marijuana once daily, buys it from a dispensary, uses it to help calm down and go to sleep.  Sleep was not well-managed with Seroquel or trazodone, complains of left aching knee pain worse when the weather is cold.  Needs refill of inhaler as well as allergy medicines.  Has a history of vitamin D deficiency.    Objective   Vital Signs:  /73   Pulse 81   Temp 97.6 °F (36.4 °C)   Ht 172.7 cm (68\")   Wt (!) 144 kg (317 lb)   SpO2 97%   BMI 48.20 kg/m²   Estimated body mass index is 48.2 kg/m² as calculated from the following:    Height as of this encounter: 172.7 cm (68\").    Weight as of this encounter: 144 kg (317 lb).             Physical Exam  Vitals and nursing note reviewed.   Constitutional:       General: She is not in acute distress.     Appearance: She is not diaphoretic.   HENT:      Head: Normocephalic and atraumatic.      Nose: Nose normal.   Eyes:      General: No scleral icterus.        Right eye: No discharge.         Left eye: No discharge.      Conjunctiva/sclera: Conjunctivae normal.   Neck:      Trachea: No tracheal deviation.   Pulmonary:      Effort: Pulmonary effort is normal.   Skin:     General: Skin is warm and dry.      Coloration: Skin is not pale.   Neurological:      Mental Status: She is alert and oriented to person, place, and time.   Psychiatric:         Behavior: Behavior normal.         " Thought Content: Thought content normal.         Judgment: Judgment normal.        Result Review :                     Assessment and Plan     Diagnoses and all orders for this visit:    1. Anxiety and depression (Primary)  -     venlafaxine XR (Effexor XR) 37.5 MG 24 hr capsule; Take 1 capsule by mouth Daily.  Dispense: 90 capsule; Refill: 0    2. Morbid (severe) obesity due to excess calories  -     Comprehensive Metabolic Panel; Future  -     Lipid panel; Future  -     Hemoglobin A1c; Future    3. Vitamin D deficiency  -     Vitamin D 25 hydroxy; Future  -     vitamin D (ERGOCALCIFEROL) 1.25 MG (24083 UT) capsule capsule; Take 1 capsule by mouth 1 (One) Time Per Week.  Dispense: 12 capsule; Refill: 3    4. Hypothyroidism, unspecified type  -     TSH; Future  -     T4, free; Future  -     levothyroxine (SYNTHROID, LEVOTHROID) 125 MCG tablet; Take 1 tablet by mouth Every Morning Before Breakfast.  Dispense: 90 tablet; Refill: 0    5. Abnormal finding of blood chemistry, unspecified  -     Hemoglobin A1c; Future    6. Upper respiratory infection with cough and congestion    7. Seasonal allergies  -     montelukast (SINGULAIR) 10 MG tablet; Take 1 tablet by mouth Every Night.  Dispense: 30 tablet; Refill: 5    8. Chronic pain of left knee  -     XR Knee 3 View Left; Future    9. Chronic low back pain with left-sided sciatica, unspecified back pain laterality  -     gabapentin (NEURONTIN) 300 MG capsule; Take 1 capsule by mouth 3 (Three) Times a Day.  Dispense: 90 capsule; Refill: 1    10. Wheezing  -     albuterol sulfate  (90 Base) MCG/ACT inhaler; Inhale 2 puffs Every 4 (Four) Hours As Needed for Wheezing.  Dispense: 18 g; Refill: 11             Follow Up     Return in about 4 weeks (around 4/25/2024) for Recheck.  Patient was given instructions and counseling regarding her condition or for health maintenance advice. Please see specific information pulled into the AVS if appropriate.

## 2024-03-29 DIAGNOSIS — M54.42 CHRONIC LOW BACK PAIN WITH LEFT-SIDED SCIATICA, UNSPECIFIED BACK PAIN LATERALITY: Primary | ICD-10-CM

## 2024-03-29 DIAGNOSIS — G89.29 CHRONIC LOW BACK PAIN WITH LEFT-SIDED SCIATICA, UNSPECIFIED BACK PAIN LATERALITY: Primary | ICD-10-CM

## 2024-03-29 RX ORDER — IBUPROFEN 800 MG/1
800 TABLET ORAL DAILY PRN
Qty: 30 TABLET | Refills: 2 | Status: SHIPPED | OUTPATIENT
Start: 2024-03-29

## 2024-03-29 NOTE — TELEPHONE ENCOUNTER
Rx Refill Note  Requested Prescriptions     Pending Prescriptions Disp Refills    ibuprofen (ADVIL,MOTRIN) 800 MG tablet 30 tablet 2     Sig: Take 1 tablet by mouth As Needed for Moderate Pain.      Last office visit with prescribing clinician: 3/28/2024   Last telemedicine visit with prescribing clinician: Visit date not found   Next office visit with prescribing clinician: 4/29/2024                         Would you like a call back once the refill request has been completed: [] Yes [] No    If the office needs to give you a call back, can they leave a voicemail: [] Yes [] No    Sravani Marie MA  03/29/24, 13:26 CDT

## 2024-04-29 ENCOUNTER — OFFICE VISIT (OUTPATIENT)
Dept: FAMILY MEDICINE CLINIC | Facility: CLINIC | Age: 39
End: 2024-04-29
Payer: MEDICARE

## 2024-04-29 VITALS
HEART RATE: 97 BPM | SYSTOLIC BLOOD PRESSURE: 134 MMHG | WEIGHT: 293 LBS | HEIGHT: 64 IN | OXYGEN SATURATION: 99 % | TEMPERATURE: 98.1 F | BODY MASS INDEX: 50.02 KG/M2 | DIASTOLIC BLOOD PRESSURE: 84 MMHG | RESPIRATION RATE: 18 BRPM

## 2024-04-29 DIAGNOSIS — F32.A ANXIETY AND DEPRESSION: Primary | ICD-10-CM

## 2024-04-29 DIAGNOSIS — F41.9 ANXIETY AND DEPRESSION: Primary | ICD-10-CM

## 2024-04-29 PROCEDURE — 1125F AMNT PAIN NOTED PAIN PRSNT: CPT | Performed by: FAMILY MEDICINE

## 2024-04-29 PROCEDURE — 1160F RVW MEDS BY RX/DR IN RCRD: CPT | Performed by: FAMILY MEDICINE

## 2024-04-29 PROCEDURE — 99214 OFFICE O/P EST MOD 30 MIN: CPT | Performed by: FAMILY MEDICINE

## 2024-04-29 PROCEDURE — 1159F MED LIST DOCD IN RCRD: CPT | Performed by: FAMILY MEDICINE

## 2024-04-29 NOTE — PROGRESS NOTES
"Chief Complaint  Follow-up (1 month follow up - pt states since being on effexor and medication is causing dizzy spells, seems like at night anxiety worsens ) and Medication Problem    Subjective        Arlen Brice presents to [facility]  History of Present Illness  Getting dizzy with effexor but helping with depression  Also been having more teeth clenching since starting effexor  Previously failed prozac, got SI with wellbutrin    Objective   Vital Signs:  /84   Pulse 97   Temp 98.1 °F (36.7 °C)   Resp 18   Ht 162.6 cm (64\")   Wt (!) 144 kg (318 lb)   SpO2 99%   BMI 54.58 kg/m²   Estimated body mass index is 54.58 kg/m² as calculated from the following:    Height as of this encounter: 162.6 cm (64\").    Weight as of this encounter: 144 kg (318 lb).             Physical Exam  Vitals and nursing note reviewed.   Constitutional:       General: She is not in acute distress.     Appearance: She is not diaphoretic.   HENT:      Head: Normocephalic and atraumatic.      Nose: Nose normal.   Eyes:      General: No scleral icterus.        Right eye: No discharge.         Left eye: No discharge.      Conjunctiva/sclera: Conjunctivae normal.   Neck:      Trachea: No tracheal deviation.   Pulmonary:      Effort: Pulmonary effort is normal.   Skin:     General: Skin is warm and dry.      Coloration: Skin is not pale.   Neurological:      Mental Status: She is alert and oriented to person, place, and time.   Psychiatric:         Mood and Affect: Mood is anxious.         Behavior: Behavior normal.         Thought Content: Thought content normal.         Judgment: Judgment normal.        Result Review :                     Assessment and Plan     Diagnoses and all orders for this visit:    1. Anxiety and depression (Primary)    DC effexor, switch to trintellix, given sample  F/u 6 weeks            "

## 2024-05-06 DIAGNOSIS — N64.52 DISCHARGE OF BREAST: Primary | ICD-10-CM

## 2024-05-22 DIAGNOSIS — N64.52 DISCHARGE OF BREAST: Primary | ICD-10-CM

## 2024-06-13 DIAGNOSIS — E03.9 HYPOTHYROIDISM, UNSPECIFIED TYPE: ICD-10-CM

## 2024-06-13 RX ORDER — LEVOTHYROXINE SODIUM 0.12 MG/1
125 TABLET ORAL
Qty: 90 TABLET | Refills: 0 | Status: SHIPPED | OUTPATIENT
Start: 2024-06-13

## 2024-06-14 ENCOUNTER — HOSPITAL ENCOUNTER (OUTPATIENT)
Dept: ULTRASOUND IMAGING | Facility: HOSPITAL | Age: 39
Discharge: HOME | End: 2024-06-14
Payer: MEDICARE

## 2024-06-14 ENCOUNTER — HOSPITAL ENCOUNTER (OUTPATIENT)
Dept: MAMMOGRAPHY | Facility: HOSPITAL | Age: 39
Discharge: HOME | End: 2024-06-14
Payer: MEDICARE

## 2024-06-14 DIAGNOSIS — N64.52 DISCHARGE OF BREAST: ICD-10-CM

## 2024-06-14 PROCEDURE — 77066 DX MAMMO INCL CAD BI: CPT

## 2024-06-14 PROCEDURE — G0279 TOMOSYNTHESIS, MAMMO: HCPCS

## 2024-06-14 PROCEDURE — 76642 ULTRASOUND BREAST LIMITED: CPT

## 2024-06-14 PROCEDURE — 76641 ULTRASOUND BREAST COMPLETE: CPT

## 2024-06-20 ENCOUNTER — OFFICE VISIT (OUTPATIENT)
Dept: FAMILY MEDICINE CLINIC | Facility: CLINIC | Age: 39
End: 2024-06-20
Payer: MEDICAID

## 2024-06-20 VITALS
TEMPERATURE: 98.8 F | OXYGEN SATURATION: 96 % | DIASTOLIC BLOOD PRESSURE: 90 MMHG | HEART RATE: 124 BPM | RESPIRATION RATE: 18 BRPM | SYSTOLIC BLOOD PRESSURE: 124 MMHG | HEIGHT: 64 IN | WEIGHT: 293 LBS | BODY MASS INDEX: 50.02 KG/M2

## 2024-06-20 DIAGNOSIS — F41.9 ANXIETY AND DEPRESSION: ICD-10-CM

## 2024-06-20 DIAGNOSIS — F32.A ANXIETY AND DEPRESSION: ICD-10-CM

## 2024-06-20 DIAGNOSIS — Z00.00 MEDICARE ANNUAL WELLNESS VISIT, SUBSEQUENT: Primary | ICD-10-CM

## 2024-06-20 DIAGNOSIS — J30.1 SEASONAL ALLERGIC RHINITIS DUE TO POLLEN: ICD-10-CM

## 2024-06-20 RX ORDER — FLUTICASONE PROPIONATE 50 MCG
2 SPRAY, SUSPENSION (ML) NASAL DAILY
Qty: 16 G | Refills: 11 | Status: SHIPPED | OUTPATIENT
Start: 2024-06-20

## 2024-06-20 RX ORDER — CETIRIZINE HYDROCHLORIDE 10 MG/1
10 TABLET ORAL DAILY
Qty: 90 TABLET | Refills: 3 | Status: SHIPPED | OUTPATIENT
Start: 2024-06-20

## 2024-06-20 RX ORDER — VORTIOXETINE 20 MG/1
20 TABLET, FILM COATED ORAL
Qty: 90 TABLET | Refills: 3 | Status: SHIPPED | OUTPATIENT
Start: 2024-06-20

## 2024-06-20 NOTE — PROGRESS NOTES
The ABCs of the Annual Wellness Visit  Subsequent Medicare Wellness Visit    Subjective    Arlen Brice is a 38 y.o. female who presents for a Subsequent Medicare Wellness Visit.    The following portions of the patient's history were reviewed and   updated as appropriate: allergies, current medications, past family history, past medical history, past social history, past surgical history, and problem list.    Compared to one year ago, the patient feels her physical   health is the same.    Compared to one year ago, the patient feels her mental   health is better.    Recent Hospitalizations:  She was not admitted to the hospital during the last year.       Current Medical Providers:  Patient Care Team:  Chidi Montelongo DO as PCP - General (Family Medicine)    Outpatient Medications Prior to Visit   Medication Sig Dispense Refill    albuterol sulfate  (90 Base) MCG/ACT inhaler Inhale 2 puffs Every 4 (Four) Hours As Needed for Wheezing. 18 g 11    gabapentin (NEURONTIN) 300 MG capsule Take 1 capsule by mouth 3 (Three) Times a Day. 90 capsule 1    ibuprofen (ADVIL,MOTRIN) 800 MG tablet Take 1 tablet by mouth Daily As Needed for Moderate Pain. 30 tablet 2    levothyroxine (SYNTHROID, LEVOTHROID) 125 MCG tablet Take 1 tablet by mouth Every Morning Before Breakfast. 90 tablet 0    montelukast (SINGULAIR) 10 MG tablet Take 1 tablet by mouth Every Night. 30 tablet 5    vitamin D (ERGOCALCIFEROL) 1.25 MG (46770 UT) capsule capsule Take 1 capsule by mouth 1 (One) Time Per Week. 12 capsule 3    Vortioxetine HBr (Trintellix) 10 MG tablet tablet Take 1 tablet by mouth Daily With Breakfast. 90 tablet 0     No facility-administered medications prior to visit.       No opioid medication identified on active medication list. I have reviewed chart for other potential  high risk medication/s and harmful drug interactions in the elderly.        Aspirin is not on active medication list.  Aspirin use is not indicated  "based on review of current medical condition/s. Risk of harm outweighs potential benefits.  .    Patient Active Problem List   Diagnosis    Chronic low back pain with left-sided sciatica     Advance Care Planning   Advance Care Planning     Advance Directive is not on file.  ACP discussion was held with the patient during this visit. Patient does not have an advance directive, information provided.     Objective    Vitals:    24 1502   BP: 124/90   Pulse: (!) 124   Resp: 18   Temp: 98.8 °F (37.1 °C)   SpO2: 96%   Weight: (!) 145 kg (320 lb)   Height: 162.6 cm (64\")     Estimated body mass index is 54.93 kg/m² as calculated from the following:    Height as of this encounter: 162.6 cm (64\").    Weight as of this encounter: 145 kg (320 lb).    Class 3 Severe Obesity (BMI >=40). Obesity-related health conditions include the following: none. Obesity is unchanged. BMI is is above average; BMI management plan is completed. We discussed portion control and increasing exercise.      Does the patient have evidence of cognitive impairment? No    Lab Results   Component Value Date    TRIG 190 (H) 2024    HDL 32 (L) 2024    LDL 93 2024    VLDL 33 2024    HGBA1C 5.30 2024        HEALTH RISK ASSESSMENT    Smoking Status:  Social History     Tobacco Use   Smoking Status Every Day    Current packs/day: 0.50    Types: Cigarettes   Smokeless Tobacco Never     Alcohol Consumption:  Social History     Substance and Sexual Activity   Alcohol Use Yes    Comment: rare     Fall Risk Screen:    STEADI Fall Risk Assessment was completed, and patient is at LOW risk for falls.Assessment completed on:2024    Depression Screenin/20/2024     3:03 PM   PHQ-2/PHQ-9 Depression Screening   Little Interest or Pleasure in Doing Things 0-->not at all   Feeling Down, Depressed or Hopeless 1-->several days   PHQ-9: Brief Depression Severity Measure Score 1       Health Habits and Functional and Cognitive " Screenin/20/2024     3:03 PM   Functional & Cognitive Status   Do you have difficulty preparing food and eating? No   Do you have difficulty bathing yourself, getting dressed or grooming yourself? No   Do you have difficulty using the toilet? No   Do you have trouble with steps or getting out of a bed or a chair? No   Current Diet Well Balanced Diet   Dental Exam Not up to date   Eye Exam Up to date   Exercise (times per week) 4 times per week   Current Exercises Include House Cleaning   Do you need help using the phone?  No   Are you deaf or do you have serious difficulty hearing?  No   Do you need help to go to places out of walking distance? No   Do you need help shopping? No   Do you need help preparing meals?  No   Do you need help with housework?  No   Do you need help with laundry? No   Do you need help taking your medications? No   Do you need help managing money? No   Do you ever drive or ride in a car without wearing a seat belt? No   Have you felt unusual stress, anger or loneliness in the last month? No   Who do you live with? Other   If you need help, do you have trouble finding someone available to you? No   Have you been bothered in the last four weeks by sexual problems? No   Do you have difficulty concentrating, remembering or making decisions? No       Age-appropriate Screening Schedule:  Refer to the list below for future screening recommendations based on patient's age, sex and/or medical conditions. Orders for these recommended tests are listed in the plan section. The patient has been provided with a written plan.    Health Maintenance   Topic Date Due    TDAP/TD VACCINES (1 - Tdap) Never done    PAP SMEAR  Never done    COVID-19 Vaccine ( - - season) Never done    INFLUENZA VACCINE  2024    ANNUAL WELLNESS VISIT  2025    BMI FOLLOWUP  2025    HEPATITIS C SCREENING  Completed    Pneumococcal Vaccine 0-64  Completed                  CMS Preventative Services  "Quick Reference  Risk Factors Identified During Encounter  None Identified  The above risks/problems have been discussed with the patient.  Pertinent information has been shared with the patient in the After Visit Summary.  An After Visit Summary and PPPS were made available to the patient.    Follow Up:   Next Medicare Wellness visit to be scheduled in 1 year.       Additional E&M Note during same encounter follows:  Patient has multiple medical problems which are significant and separately identifiable that require additional work above and beyond the Medicare Wellness Visit.      Chief Complaint  Medicare Wellness-subsequent and Allergies (Pt states dealing with allergies )    Subjective        HPI  Arlen Brice is also being seen today for struggling with worsening seasonal allergies, using benadryl at times. Mood improved on vortioxetine, says that she has titrated up to 60 mg daily.          Objective   Vital Signs:  /90   Pulse (!) 124   Temp 98.8 °F (37.1 °C)   Resp 18   Ht 162.6 cm (64\")   Wt (!) 145 kg (320 lb)   SpO2 96%   BMI 54.93 kg/m²     Physical Exam  Vitals and nursing note reviewed.   Constitutional:       General: She is not in acute distress.     Appearance: She is not diaphoretic.   HENT:      Head: Normocephalic and atraumatic.      Nose: Nose normal. Congestion present.   Eyes:      General: No scleral icterus.        Right eye: No discharge.         Left eye: No discharge.      Conjunctiva/sclera: Conjunctivae normal.   Neck:      Trachea: No tracheal deviation.   Pulmonary:      Effort: Pulmonary effort is normal.   Skin:     General: Skin is warm and dry.      Coloration: Skin is not pale.   Neurological:      Mental Status: She is alert and oriented to person, place, and time.   Psychiatric:         Behavior: Behavior normal.         Thought Content: Thought content normal.         Judgment: Judgment normal.                         Assessment and Plan   Diagnoses and " all orders for this visit:    1. Medicare annual wellness visit, subsequent (Primary)    2. Anxiety and depression  -     Vortioxetine HBr (Trintellix) 20 MG tablet; Take 1 tablet by mouth Daily With Breakfast.  Dispense: 90 tablet; Refill: 3    3. Seasonal allergic rhinitis due to pollen  -     cetirizine (zyrTEC) 10 MG tablet; Take 1 tablet by mouth Daily.  Dispense: 90 tablet; Refill: 3  -     fluticasone (FLONASE) 50 MCG/ACT nasal spray; Instill 2 sprays into the nostril(s) as directed by provider Daily.  Dispense: 16 g; Refill: 11    Clarified max dose of antidepressant above         Follow Up   Return in about 3 months (around 9/20/2024).  Patient was given instructions and counseling regarding her condition or for health maintenance advice. Please see specific information pulled into the AVS if appropriate.

## 2024-11-06 DIAGNOSIS — G89.29 CHRONIC LOW BACK PAIN WITH LEFT-SIDED SCIATICA, UNSPECIFIED BACK PAIN LATERALITY: ICD-10-CM

## 2024-11-06 DIAGNOSIS — E03.9 HYPOTHYROIDISM, UNSPECIFIED TYPE: ICD-10-CM

## 2024-11-06 DIAGNOSIS — J30.2 SEASONAL ALLERGIES: ICD-10-CM

## 2024-11-06 DIAGNOSIS — M54.42 CHRONIC LOW BACK PAIN WITH LEFT-SIDED SCIATICA, UNSPECIFIED BACK PAIN LATERALITY: ICD-10-CM

## 2024-11-06 PROCEDURE — 87086 URINE CULTURE/COLONY COUNT: CPT | Performed by: FAMILY MEDICINE

## 2024-11-06 PROCEDURE — 87076 CULTURE ANAEROBE IDENT EACH: CPT | Performed by: FAMILY MEDICINE

## 2024-11-06 RX ORDER — LEVOTHYROXINE SODIUM 125 UG/1
125 TABLET ORAL
Qty: 90 TABLET | Refills: 0 | Status: SHIPPED | OUTPATIENT
Start: 2024-11-06

## 2024-11-06 RX ORDER — GABAPENTIN 300 MG/1
300 CAPSULE ORAL 3 TIMES DAILY
Qty: 90 CAPSULE | Refills: 1 | Status: CANCELLED | OUTPATIENT
Start: 2024-11-06

## 2024-11-06 RX ORDER — MONTELUKAST SODIUM 10 MG/1
10 TABLET ORAL NIGHTLY
Qty: 30 TABLET | Refills: 5 | Status: SHIPPED | OUTPATIENT
Start: 2024-11-06

## 2024-11-08 RX ORDER — GABAPENTIN 300 MG/1
300 CAPSULE ORAL 3 TIMES DAILY
Qty: 90 CAPSULE | Refills: 1 | Status: SHIPPED | OUTPATIENT
Start: 2024-11-08

## 2024-11-22 ENCOUNTER — TELEPHONE (OUTPATIENT)
Dept: FAMILY MEDICINE CLINIC | Facility: CLINIC | Age: 39
End: 2024-11-22

## 2024-12-22 DIAGNOSIS — E03.9 HYPOTHYROIDISM, UNSPECIFIED TYPE: ICD-10-CM

## 2024-12-22 DIAGNOSIS — M54.42 CHRONIC LOW BACK PAIN WITH LEFT-SIDED SCIATICA, UNSPECIFIED BACK PAIN LATERALITY: ICD-10-CM

## 2024-12-22 DIAGNOSIS — G89.29 CHRONIC LOW BACK PAIN WITH LEFT-SIDED SCIATICA, UNSPECIFIED BACK PAIN LATERALITY: ICD-10-CM

## 2024-12-23 RX ORDER — LEVOTHYROXINE SODIUM 125 UG/1
125 TABLET ORAL
Qty: 90 TABLET | Refills: 0 | OUTPATIENT
Start: 2024-12-23

## 2024-12-23 RX ORDER — IBUPROFEN 800 MG/1
800 TABLET, FILM COATED ORAL DAILY PRN
Qty: 30 TABLET | Refills: 2 | OUTPATIENT
Start: 2024-12-23